# Patient Record
Sex: MALE | URBAN - METROPOLITAN AREA
[De-identification: names, ages, dates, MRNs, and addresses within clinical notes are randomized per-mention and may not be internally consistent; named-entity substitution may affect disease eponyms.]

---

## 2018-11-05 ENCOUNTER — ON CAMPUS - OUTPATIENT (OUTPATIENT)
Dept: URBAN - METROPOLITAN AREA HOSPITAL 85 | Facility: HOSPITAL | Age: 68
End: 2018-11-05

## 2018-11-05 DIAGNOSIS — R13.10 DYSPHAGIA, UNSPECIFIED: ICD-10-CM

## 2018-11-05 DIAGNOSIS — T18.128A FOOD IN ESOPHAGUS CAUSING OTHER INJURY, INITIAL ENCOUNTER: ICD-10-CM

## 2018-11-05 PROCEDURE — 43247 EGD REMOVE FOREIGN BODY: CPT | Performed by: INTERNAL MEDICINE

## 2019-10-14 ENCOUNTER — HOSPITAL ENCOUNTER (OUTPATIENT)
Dept: OTHER | Facility: HOSPITAL | Age: 69
Discharge: HOME | End: 2019-10-14
Attending: FAMILY MEDICINE

## 2019-10-14 LAB
ALBUMIN SERPL-MCNC: 4.2 G/DL (ref 3.5–5)
ALBUMIN/GLOB SERPL: 1.4 {RATIO} (ref 1.4–2.6)
ALP SERPL-CCNC: 79 U/L (ref 56–155)
ALT SERPL-CCNC: <5 U/L (ref 10–40)
ANION GAP SERPL CALC-SCNC: 19 MMOL/L (ref 8–19)
AST SERPL-CCNC: 21 U/L (ref 15–50)
BASOPHILS # BLD AUTO: 0.02 10*3/UL (ref 0–0.2)
BASOPHILS NFR BLD AUTO: 0.4 % (ref 0–3)
BILIRUB SERPL-MCNC: 0.34 MG/DL (ref 0.2–1.3)
BUN SERPL-MCNC: 25 MG/DL (ref 5–25)
BUN/CREAT SERPL: 23 {RATIO} (ref 6–20)
CALCIUM SERPL-MCNC: 9 MG/DL (ref 8.7–10.4)
CHLORIDE SERPL-SCNC: 104 MMOL/L (ref 99–111)
CHOLEST SERPL-MCNC: 153 MG/DL (ref 107–200)
CHOLEST/HDLC SERPL: 3.6 {RATIO} (ref 3–6)
CONV ABS IMM GRAN: 0.04 10*3/UL (ref 0–0.2)
CONV CO2: 23 MMOL/L (ref 22–32)
CONV IMMATURE GRAN: 0.8 % (ref 0–1.8)
CONV TOTAL PROTEIN: 7.3 G/DL (ref 6.3–8.2)
CREAT UR-MCNC: 1.1 MG/DL (ref 0.7–1.2)
DEPRECATED RDW RBC AUTO: 40.5 FL (ref 35.1–43.9)
EOSINOPHIL # BLD AUTO: 0.22 10*3/UL (ref 0–0.7)
EOSINOPHIL # BLD AUTO: 4.5 % (ref 0–7)
ERYTHROCYTE [DISTWIDTH] IN BLOOD BY AUTOMATED COUNT: 11.8 % (ref 11.6–14.4)
GFR SERPLBLD BASED ON 1.73 SQ M-ARVRAT: >60 ML/MIN/{1.73_M2}
GLOBULIN UR ELPH-MCNC: 3.1 G/DL (ref 2–3.5)
GLUCOSE SERPL-MCNC: 96 MG/DL (ref 70–99)
HCT VFR BLD AUTO: 38.6 % (ref 42–52)
HDLC SERPL-MCNC: 42 MG/DL (ref 40–60)
HGB BLD-MCNC: 13.2 G/DL (ref 14–18)
LDLC SERPL CALC-MCNC: 93 MG/DL (ref 70–100)
LYMPHOCYTES # BLD AUTO: 1.09 10*3/UL (ref 1–5)
LYMPHOCYTES NFR BLD AUTO: 22.1 % (ref 20–45)
MCH RBC QN AUTO: 31.9 PG (ref 27–31)
MCHC RBC AUTO-ENTMCNC: 34.2 G/DL (ref 33–37)
MCV RBC AUTO: 93.2 FL (ref 80–96)
MONOCYTES # BLD AUTO: 0.48 10*3/UL (ref 0.2–1.2)
MONOCYTES NFR BLD AUTO: 9.7 % (ref 3–10)
NEUTROPHILS # BLD AUTO: 3.08 10*3/UL (ref 2–8)
NEUTROPHILS NFR BLD AUTO: 62.5 % (ref 30–85)
NRBC CBCN: 0 % (ref 0–0.7)
OSMOLALITY SERPL CALC.SUM OF ELEC: 296 MOSM/KG (ref 273–304)
PLATELET # BLD AUTO: 219 10*3/UL (ref 130–400)
PMV BLD AUTO: 9.4 FL (ref 9.4–12.4)
POTASSIUM SERPL-SCNC: 4.7 MMOL/L (ref 3.5–5.3)
PSA SERPL-MCNC: 0.39 NG/ML (ref 0–4)
RBC # BLD AUTO: 4.14 10*6/UL (ref 4.7–6.1)
SODIUM SERPL-SCNC: 141 MMOL/L (ref 135–147)
TRIGL SERPL-MCNC: 92 MG/DL (ref 40–150)
VLDLC SERPL-MCNC: 18 MG/DL (ref 5–37)
WBC # BLD AUTO: 4.93 10*3/UL (ref 4.8–10.8)

## 2020-06-01 ENCOUNTER — HOSPITAL ENCOUNTER (OUTPATIENT)
Dept: GENERAL RADIOLOGY | Facility: HOSPITAL | Age: 70
Discharge: HOME | End: 2020-06-01
Attending: FAMILY MEDICINE

## 2020-07-07 ENCOUNTER — HOSPITAL ENCOUNTER (OUTPATIENT)
Dept: FAMILY MEDICINE CLINIC | Facility: CLINIC | Age: 70
Discharge: HOME | End: 2020-07-07
Attending: FAMILY MEDICINE

## 2020-07-07 ENCOUNTER — HOSPITAL ENCOUNTER (OUTPATIENT)
Dept: GENERAL RADIOLOGY | Facility: HOSPITAL | Age: 70
Discharge: HOME | End: 2020-07-07
Attending: FAMILY MEDICINE

## 2020-07-07 LAB
ALBUMIN SERPL-MCNC: 4.2 G/DL (ref 3.5–5)
ALBUMIN/GLOB SERPL: 1.4 {RATIO} (ref 1.4–2.6)
ALP SERPL-CCNC: 89 U/L (ref 56–155)
ALT SERPL-CCNC: 6 U/L (ref 10–40)
ANION GAP SERPL CALC-SCNC: 12 MMOL/L (ref 8–19)
AST SERPL-CCNC: 19 U/L (ref 15–50)
BASOPHILS # BLD AUTO: 0.04 10*3/UL (ref 0–0.2)
BASOPHILS NFR BLD AUTO: 1 % (ref 0–3)
BILIRUB SERPL-MCNC: 0.32 MG/DL (ref 0.2–1.3)
BUN SERPL-MCNC: 25 MG/DL (ref 5–25)
BUN/CREAT SERPL: 20 {RATIO} (ref 6–20)
CALCIUM SERPL-MCNC: 8.7 MG/DL (ref 8.7–10.4)
CHLORIDE SERPL-SCNC: 102 MMOL/L (ref 99–111)
CONV ABS IMM GRAN: 0.01 10*3/UL (ref 0–0.2)
CONV CO2: 27 MMOL/L (ref 22–32)
CONV IMMATURE GRAN: 0.2 % (ref 0–1.8)
CONV TOTAL PROTEIN: 7.1 G/DL (ref 6.3–8.2)
CREAT UR-MCNC: 1.23 MG/DL (ref 0.7–1.2)
DEPRECATED RDW RBC AUTO: 41.2 FL (ref 35.1–43.9)
EOSINOPHIL # BLD AUTO: 0.23 10*3/UL (ref 0–0.7)
EOSINOPHIL # BLD AUTO: 5.5 % (ref 0–7)
ERYTHROCYTE [DISTWIDTH] IN BLOOD BY AUTOMATED COUNT: 11.9 % (ref 11.6–14.4)
GFR SERPLBLD BASED ON 1.73 SQ M-ARVRAT: 59 ML/MIN/{1.73_M2}
GLOBULIN UR ELPH-MCNC: 2.9 G/DL (ref 2–3.5)
GLUCOSE SERPL-MCNC: 90 MG/DL (ref 70–99)
HCT VFR BLD AUTO: 40.1 % (ref 42–52)
HGB BLD-MCNC: 12.9 G/DL (ref 14–18)
LDH SERPL-CCNC: 240 U/L (ref 120–240)
LYMPHOCYTES # BLD AUTO: 1.43 10*3/UL (ref 1–5)
LYMPHOCYTES NFR BLD AUTO: 34 % (ref 20–45)
MCH RBC QN AUTO: 30.6 PG (ref 27–31)
MCHC RBC AUTO-ENTMCNC: 32.2 G/DL (ref 33–37)
MCV RBC AUTO: 95 FL (ref 80–96)
MONOCYTES # BLD AUTO: 0.39 10*3/UL (ref 0.2–1.2)
MONOCYTES NFR BLD AUTO: 9.3 % (ref 3–10)
NEUTROPHILS # BLD AUTO: 2.1 10*3/UL (ref 2–8)
NEUTROPHILS NFR BLD AUTO: 50 % (ref 30–85)
NRBC CBCN: 0 % (ref 0–0.7)
OSMOLALITY SERPL CALC.SUM OF ELEC: 288 MOSM/KG (ref 273–304)
PLATELET # BLD AUTO: 205 10*3/UL (ref 130–400)
PMV BLD AUTO: 9.7 FL (ref 9.4–12.4)
POTASSIUM SERPL-SCNC: 4.4 MMOL/L (ref 3.5–5.3)
RBC # BLD AUTO: 4.22 10*6/UL (ref 4.7–6.1)
SODIUM SERPL-SCNC: 137 MMOL/L (ref 135–147)
TSH SERPL-ACNC: 1.99 M[IU]/L (ref 0.27–4.2)
WBC # BLD AUTO: 4.2 10*3/UL (ref 4.8–10.8)

## 2020-08-11 ENCOUNTER — HOSPITAL ENCOUNTER (OUTPATIENT)
Dept: LAB | Facility: HOSPITAL | Age: 70
Discharge: HOME | End: 2020-08-11
Attending: FAMILY MEDICINE

## 2020-08-11 LAB
BASOPHILS # BLD AUTO: 0.04 10*3/UL (ref 0–0.2)
BASOPHILS NFR BLD AUTO: 1 % (ref 0–3)
CONV ABS IMM GRAN: 0.02 10*3/UL (ref 0–0.2)
CONV IMMATURE GRAN: 0.5 % (ref 0–1.8)
DEPRECATED RDW RBC AUTO: 42.2 FL (ref 35.1–43.9)
EOSINOPHIL # BLD AUTO: 0.3 10*3/UL (ref 0–0.7)
EOSINOPHIL # BLD AUTO: 7.4 % (ref 0–7)
ERYTHROCYTE [DISTWIDTH] IN BLOOD BY AUTOMATED COUNT: 12.1 % (ref 11.6–14.4)
HCT VFR BLD AUTO: 37.4 % (ref 42–52)
HGB BLD-MCNC: 12.1 G/DL (ref 14–18)
LYMPHOCYTES # BLD AUTO: 1.27 10*3/UL (ref 1–5)
LYMPHOCYTES NFR BLD AUTO: 31.4 % (ref 20–45)
MCH RBC QN AUTO: 30.9 PG (ref 27–31)
MCHC RBC AUTO-ENTMCNC: 32.4 G/DL (ref 33–37)
MCV RBC AUTO: 95.7 FL (ref 80–96)
MONOCYTES # BLD AUTO: 0.35 10*3/UL (ref 0.2–1.2)
MONOCYTES NFR BLD AUTO: 8.7 % (ref 3–10)
NEUTROPHILS # BLD AUTO: 2.06 10*3/UL (ref 2–8)
NEUTROPHILS NFR BLD AUTO: 51 % (ref 30–85)
NRBC CBCN: 0 % (ref 0–0.7)
PLATELET # BLD AUTO: 178 10*3/UL (ref 130–400)
PMV BLD AUTO: 9.8 FL (ref 9.4–12.4)
RBC # BLD AUTO: 3.91 10*6/UL (ref 4.7–6.1)
WBC # BLD AUTO: 4.04 10*3/UL (ref 4.8–10.8)

## 2020-09-11 ENCOUNTER — HOSPITAL ENCOUNTER (OUTPATIENT)
Dept: LAB | Facility: HOSPITAL | Age: 70
Discharge: HOME | End: 2020-09-11
Attending: FAMILY MEDICINE

## 2020-09-11 LAB
BASOPHILS # BLD AUTO: 0.04 10*3/UL (ref 0–0.2)
BASOPHILS NFR BLD AUTO: 0.8 % (ref 0–3)
CONV ABS IMM GRAN: 0.03 10*3/UL (ref 0–0.2)
CONV IMMATURE GRAN: 0.6 % (ref 0–1.8)
DEPRECATED RDW RBC AUTO: 41.1 FL (ref 35.1–43.9)
EOSINOPHIL # BLD AUTO: 0.3 10*3/UL (ref 0–0.7)
EOSINOPHIL # BLD AUTO: 6 % (ref 0–7)
ERYTHROCYTE [DISTWIDTH] IN BLOOD BY AUTOMATED COUNT: 11.9 % (ref 11.6–14.4)
HCT VFR BLD AUTO: 39.9 % (ref 42–52)
HGB BLD-MCNC: 13.1 G/DL (ref 14–18)
LYMPHOCYTES # BLD AUTO: 1.62 10*3/UL (ref 1–5)
LYMPHOCYTES NFR BLD AUTO: 32.5 % (ref 20–45)
MCH RBC QN AUTO: 31.2 PG (ref 27–31)
MCHC RBC AUTO-ENTMCNC: 32.8 G/DL (ref 33–37)
MCV RBC AUTO: 95 FL (ref 80–96)
MONOCYTES # BLD AUTO: 0.43 10*3/UL (ref 0.2–1.2)
MONOCYTES NFR BLD AUTO: 8.6 % (ref 3–10)
NEUTROPHILS # BLD AUTO: 2.56 10*3/UL (ref 2–8)
NEUTROPHILS NFR BLD AUTO: 51.5 % (ref 30–85)
NRBC CBCN: 0 % (ref 0–0.7)
PLATELET # BLD AUTO: 195 10*3/UL (ref 130–400)
PMV BLD AUTO: 9.5 FL (ref 9.4–12.4)
RBC # BLD AUTO: 4.2 10*6/UL (ref 4.7–6.1)
WBC # BLD AUTO: 4.98 10*3/UL (ref 4.8–10.8)

## 2020-11-13 ENCOUNTER — HOSPITAL ENCOUNTER (OUTPATIENT)
Dept: LAB | Facility: HOSPITAL | Age: 70
Discharge: HOME | End: 2020-11-13
Attending: FAMILY MEDICINE

## 2020-11-13 LAB
BASOPHILS # BLD AUTO: 0.04 10*3/UL (ref 0–0.2)
BASOPHILS NFR BLD AUTO: 1 % (ref 0–3)
CONV ABS IMM GRAN: 0.01 10*3/UL (ref 0–0.2)
CONV IMMATURE GRAN: 0.3 % (ref 0–1.8)
DEPRECATED RDW RBC AUTO: 40.2 FL (ref 35.1–43.9)
EOSINOPHIL # BLD AUTO: 0.28 10*3/UL (ref 0–0.7)
EOSINOPHIL # BLD AUTO: 7.2 % (ref 0–7)
ERYTHROCYTE [DISTWIDTH] IN BLOOD BY AUTOMATED COUNT: 11.8 % (ref 11.6–14.4)
HCT VFR BLD AUTO: 39.1 % (ref 42–52)
HGB BLD-MCNC: 13 G/DL (ref 14–18)
LYMPHOCYTES # BLD AUTO: 1.46 10*3/UL (ref 1–5)
LYMPHOCYTES NFR BLD AUTO: 37.4 % (ref 20–45)
MCH RBC QN AUTO: 31.1 PG (ref 27–31)
MCHC RBC AUTO-ENTMCNC: 33.2 G/DL (ref 33–37)
MCV RBC AUTO: 93.5 FL (ref 80–96)
MONOCYTES # BLD AUTO: 0.4 10*3/UL (ref 0.2–1.2)
MONOCYTES NFR BLD AUTO: 10.3 % (ref 3–10)
NEUTROPHILS # BLD AUTO: 1.71 10*3/UL (ref 2–8)
NEUTROPHILS NFR BLD AUTO: 43.8 % (ref 30–85)
NRBC CBCN: 0 % (ref 0–0.7)
PLATELET # BLD AUTO: 203 10*3/UL (ref 130–400)
PMV BLD AUTO: 9.2 FL (ref 9.4–12.4)
PSA SERPL-MCNC: 0.56 NG/ML (ref 0–4)
RBC # BLD AUTO: 4.18 10*6/UL (ref 4.7–6.1)
WBC # BLD AUTO: 3.9 10*3/UL (ref 4.8–10.8)

## 2021-01-18 ENCOUNTER — HOSPITAL ENCOUNTER (OUTPATIENT)
Dept: OTHER | Facility: HOSPITAL | Age: 71
Discharge: HOME | End: 2021-01-18
Attending: INTERNAL MEDICINE

## 2021-02-12 ENCOUNTER — HOSPITAL ENCOUNTER (OUTPATIENT)
Dept: OTHER | Facility: HOSPITAL | Age: 71
Discharge: HOME | End: 2021-02-12
Attending: FAMILY MEDICINE

## 2021-02-12 ENCOUNTER — HOSPITAL ENCOUNTER (OUTPATIENT)
Dept: VACCINE CLINIC | Facility: HOSPITAL | Age: 71
Discharge: HOME | End: 2021-02-12
Attending: INTERNAL MEDICINE

## 2021-02-12 LAB
ALBUMIN SERPL-MCNC: 3.9 G/DL (ref 3.5–5)
ALBUMIN/GLOB SERPL: 1.3 {RATIO} (ref 1.4–2.6)
ALP SERPL-CCNC: 82 U/L (ref 56–155)
ALT SERPL-CCNC: 7 U/L (ref 10–40)
ANION GAP SERPL CALC-SCNC: 13 MMOL/L (ref 8–19)
AST SERPL-CCNC: 17 U/L (ref 15–50)
BASOPHILS # BLD AUTO: 0.04 10*3/UL (ref 0–0.2)
BASOPHILS NFR BLD AUTO: 0.9 % (ref 0–3)
BILIRUB SERPL-MCNC: 0.35 MG/DL (ref 0.2–1.3)
BUN SERPL-MCNC: 24 MG/DL (ref 5–25)
BUN/CREAT SERPL: 22 {RATIO} (ref 6–20)
CALCIUM SERPL-MCNC: 8.4 MG/DL (ref 8.7–10.4)
CHLORIDE SERPL-SCNC: 105 MMOL/L (ref 99–111)
CONV ABS IMM GRAN: 0.03 10*3/UL (ref 0–0.2)
CONV CO2: 27 MMOL/L (ref 22–32)
CONV IMMATURE GRAN: 0.7 % (ref 0–1.8)
CONV TOTAL PROTEIN: 7 G/DL (ref 6.3–8.2)
CREAT UR-MCNC: 1.09 MG/DL (ref 0.7–1.2)
DEPRECATED RDW RBC AUTO: 40.4 FL (ref 35.1–43.9)
EOSINOPHIL # BLD AUTO: 0.25 10*3/UL (ref 0–0.7)
EOSINOPHIL # BLD AUTO: 5.4 % (ref 0–7)
ERYTHROCYTE [DISTWIDTH] IN BLOOD BY AUTOMATED COUNT: 11.8 % (ref 11.6–14.4)
GFR SERPLBLD BASED ON 1.73 SQ M-ARVRAT: >60 ML/MIN/{1.73_M2}
GLOBULIN UR ELPH-MCNC: 3.1 G/DL (ref 2–3.5)
GLUCOSE SERPL-MCNC: 92 MG/DL (ref 70–99)
HCT VFR BLD AUTO: 39.6 % (ref 42–52)
HGB BLD-MCNC: 13.5 G/DL (ref 14–18)
LYMPHOCYTES # BLD AUTO: 1.57 10*3/UL (ref 1–5)
LYMPHOCYTES NFR BLD AUTO: 34.1 % (ref 20–45)
MCH RBC QN AUTO: 31.7 PG (ref 27–31)
MCHC RBC AUTO-ENTMCNC: 34.1 G/DL (ref 33–37)
MCV RBC AUTO: 93 FL (ref 80–96)
MONOCYTES # BLD AUTO: 0.41 10*3/UL (ref 0.2–1.2)
MONOCYTES NFR BLD AUTO: 8.9 % (ref 3–10)
NEUTROPHILS # BLD AUTO: 2.31 10*3/UL (ref 2–8)
NEUTROPHILS NFR BLD AUTO: 50 % (ref 30–85)
NRBC CBCN: 0 % (ref 0–0.7)
OSMOLALITY SERPL CALC.SUM OF ELEC: 296 MOSM/KG (ref 273–304)
PLATELET # BLD AUTO: 183 10*3/UL (ref 130–400)
PMV BLD AUTO: 8.6 FL (ref 9.4–12.4)
POTASSIUM SERPL-SCNC: 4.1 MMOL/L (ref 3.5–5.3)
RBC # BLD AUTO: 4.26 10*6/UL (ref 4.7–6.1)
SODIUM SERPL-SCNC: 141 MMOL/L (ref 135–147)
WBC # BLD AUTO: 4.61 10*3/UL (ref 4.8–10.8)

## 2021-05-25 ENCOUNTER — CONVERSION ENCOUNTER (OUTPATIENT)
Dept: SURGERY | Facility: CLINIC | Age: 71
End: 2021-05-25

## 2021-05-25 ENCOUNTER — OFFICE VISIT CONVERTED (OUTPATIENT)
Dept: UROLOGY | Facility: CLINIC | Age: 71
End: 2021-05-25
Attending: UROLOGY

## 2021-06-01 ENCOUNTER — OFFICE VISIT CONVERTED (OUTPATIENT)
Dept: UROLOGY | Facility: CLINIC | Age: 71
End: 2021-06-01
Attending: NURSE PRACTITIONER

## 2021-06-01 ENCOUNTER — CONVERSION ENCOUNTER (OUTPATIENT)
Dept: SURGERY | Facility: CLINIC | Age: 71
End: 2021-06-01

## 2021-06-01 LAB
BILIRUB UR QL STRIP: NORMAL
COLOR UR: YELLOW
CONV BACTERIA IN URINE MICRO: 0
CONV CALCIUM OXALATE CRYSTALS /HPF IN URINE SEDIMENT BY MICROSCOPY: 0
CONV CLARITY OF URINE: CLEAR
CONV PROTEIN IN URINE BY AUTOMATED TEST STRIP: NEGATIVE
CONV UROBILINOGEN IN URINE BY AUTOMATED TEST STRIP: NORMAL
GLUCOSE UR QL: NEGATIVE
HGB UR QL STRIP: NEGATIVE
KETONES UR QL STRIP: NEGATIVE
LEUKOCYTE ESTERASE UR QL STRIP: NEGATIVE
NITRITE UR QL STRIP: NEGATIVE
PH UR STRIP.AUTO: 5.5 [PH]
RBC #/AREA URNS HPF: 0 /[HPF]
RENAL EPI CELLS #/AREA URNS HPF: 0 /[HPF]
SP GR UR: NORMAL
SQUAMOUS SPT QL MICRO: 0
WBC #/AREA URNS HPF: 0 /[HPF]

## 2021-06-05 NOTE — PROGRESS NOTES
Progress Note      Patient Name: Chinmay Roque   Patient ID: [id number]   Sex: Male   YOB: 1950    Primary Care Provider: Taylor Blake MD    Visit Date: June 1, 2021    Provider: MALGORZATA Horton   Location: [facility]   Location Address: [address] [place] [address]  [id number]   Location Phone: [phone]          Chief Complaint  · urological issues      History Of Present Illness     70-year-old  male patient presents for follow-up PVR check.    Previously the patient had been in urinary retention with a catheter placed.    The patient had catheter removed at last visit and had been started on Flomax 0.4 mg daily.    The patient states that he has 0 side effects from the Tamsulosin.    He is voiding without issue at this time.    PVR today is 83 mL       Past Surgical History  Procedure Name Date Notes   Colonoscopy 2018 --          Medication List  Name Date Started Instructions   aspirin 81 mg oral tablet,chewable  chew 1 tablet (81 mg) by oral route once daily   clonazepam 1 mg oral tablet  take 1 tablet (1 mg) by oral route 2 times per day   Ferrex 150 Forte 150-25-1 mg-mcg-mg oral capsule  take 1 capsule by oral route once daily   Flomax 0.4 mg oral capsule 05/25/2021 take 1 capsule (0.4 mg) by oral route once daily 1/2 hour following the same meal each day for 90 days   Seroquel 400 mg oral tablet  take 1 tablet (400 mg) by oral route once daily   vitamin B complex oral capsule  take 1 capsule by oral route daily         Allergy List  Allergen Name Date Reaction Notes   NO KNOWN DRUG ALLERGIES --  --  --        Allergies Reconciled  Family Medical History  Disease Name Relative/Age Notes   - No Family History of Colorectal Cancer  --    Prostate cancer Brother/   --          Social History  Finding Status Start/Stop Quantity Notes   Alcohol Current some day --/-- --  05/25/2021 - occasionally   Tobacco Never --/-- --  --          Review of  "Systems  · Constitutional  o Denies  o : fatigue, night sweats  · Eyes  o Denies  o : double vision, blurred vision  · HENT  o Denies  o : vertigo, recent head injury  · Breasts  o Denies  o : abnormal changes in breast size, additional breast symptoms except as noted in the HPI  · Cardiovascular  o Denies  o : chest pain, irregular heart beats  · Respiratory  o Denies  o : shortness of breath, productive cough  · Gastrointestinal  o Denies  o : nausea, vomiting  · Genitourinary  o Denies  o : urgency, frequency, dysuria, hematuria, urinary retention  · Integument  o Denies  o : hair growth change, new skin lesions  · Neurologic  o Denies  o : altered mental status, seizures  · Musculoskeletal  o Denies  o : joint swelling, limitation of motion  · Endocrine  o Denies  o : cold intolerance, heat intolerance  · Heme-Lymph  o Denies  o : petechiae, lymph node enlargement or tenderness  · Allergic-Immunologic  o Denies  o : frequent illnesses      Vitals  Date Time BP Position Site L\R Cuff Size HR RR TEMP (F) WT  HT  BMI kg/m2 BSA m2 O2 Sat FR L/min FiO2        06/01/2021 10:15 AM       12  210lbs 0oz 6'  4\" 25.56 2.26             Physical Examination  · Constitutional  o Appearance  o : Well-appearing, well-developed, in no acute distress  · Head and Face  o Head  o :   § Inspection  § : atraumatic, normocephalic  o Face  o :   § Inspection  § : no facial lesions  · Eyes  o Sclerae  o : sclerae white  · Ears, Nose, Mouth and Throat  o Ears  o :   § External Ears  § : appearance within normal limits, no lesions present  o Nose  o :   § External Nose  § : appearance normal  · Respiratory  o Respiratory Effort  o : Unlabored breathing  o Inspection of Chest  o : normal appearance, no retractions  · Musculoskeletal  o Pelvis  o : no pelvic bony or muscular tenderness  o Ribs  o : no deformities or tenderness to palpation present, costochondral junctions nontender to palpation  · Skin and Subcutaneous Tissue  o General " Inspection  o : no rashes or lesions present, no lesions present, no areas of discoloration  · Neurologic  o Mental Status Examination  o :   § Orientation  § : Grossly oriented to person, place and time, judgment and insight intact, normal mood and affect  § Speech/Language  § : communication ability within normal limits  o Gait and Station  o : normal gait, able to stand without difficulty  · Psychiatric  o Judgement and Insight  o : judgment and insight intact, judgement for everyday activities and social situations within normal limits, insight intact  o Mood and Affect  o : mood normal, affect appropriate          Results  · In-Office Procedures  o Surgical procedure  § IOP - Bladder Scan/Residual Urine (29171)   § Specimen vol Ur: 93   o Lab procedure  § Automated dipstick urinalysis with microscopy (73862)   § Color Ur: Yellow   § Clarity Ur: Clear   § Glucose Ur Ql Strip: Negative   § Bilirub Ur Ql Strip: Large   § Ketones Ur Ql Strip: Negative   § Sp Gr Ur Qn: greater than 1.030   § Hgb Ur Ql Strip: Negative   § pH Ur-LsCnc: 5.5   § Prot Ur Ql Strip: Negative   § Urobilinogen Ur Strip-mCnc: 0.2 E.U./dL   § Nitrite Ur Ql Strip: Negative   § WBC Est Ur Ql Strip: Negative   § RBC UrnS Qn HPF: 0   § WBC UrnS Qn HPF: 0   § Bacteria UrnS Qn HPF: 0   § Crystals UrnS Qn HPF: 0   § Epithelial Cells (non renal): 0 /HPF  § Epithelial Cells (renal): 0       Assessment  · BPH (benign prostatic hyperplasia)     600.00/N40.0  · Urinary retention     788.20/R33.9      Plan  · Medications  o Medications have been Reconciled  o Transition of Care or Provider Policy  · Instructions  o Electronically Identified Patient Education Materials Provided Electronically     BPH with urinary retention    Patient to Continue Flomax 0.4 mg daily.     Patient will follow up in 1 month as scheduled with Dr. Osullivan.                     Electronically Signed by: MALGORZATA Horton -Author on June 1, 2021 12:23:11 PM
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
## 2021-06-28 PROBLEM — R35.0 BENIGN PROSTATIC HYPERPLASIA WITH URINARY FREQUENCY: Status: ACTIVE | Noted: 2021-06-28

## 2021-06-28 PROBLEM — N40.1 BENIGN PROSTATIC HYPERPLASIA WITH URINARY FREQUENCY: Status: ACTIVE | Noted: 2021-06-28

## 2021-06-28 PROBLEM — R33.9 URINARY RETENTION: Status: ACTIVE | Noted: 2021-06-28
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
## 2021-06-29 ENCOUNTER — OFFICE VISIT (OUTPATIENT)
Dept: UROLOGY | Facility: CLINIC | Age: 71
End: 2021-06-29

## 2021-06-29 VITALS — RESPIRATION RATE: 17 BRPM | WEIGHT: 210 LBS | BODY MASS INDEX: 25.57 KG/M2 | HEIGHT: 76 IN

## 2021-06-29 DIAGNOSIS — R35.0 BENIGN PROSTATIC HYPERPLASIA WITH URINARY FREQUENCY: Primary | ICD-10-CM

## 2021-06-29 DIAGNOSIS — R33.9 URINARY RETENTION: ICD-10-CM

## 2021-06-29 DIAGNOSIS — N40.1 BENIGN PROSTATIC HYPERPLASIA WITH URINARY FREQUENCY: Primary | ICD-10-CM

## 2021-06-29 LAB — SPECIMEN VOL 24H UR: 7 L

## 2021-06-29 PROCEDURE — 99213 OFFICE O/P EST LOW 20 MIN: CPT | Performed by: UROLOGY

## 2021-06-29 RX ORDER — ETODOLAC 400 MG/1
400 TABLET, EXTENDED RELEASE ORAL 2 TIMES DAILY WITH MEALS
COMMUNITY
Start: 2021-05-29 | End: 2021-11-22 | Stop reason: SDUPTHER

## 2021-06-29 RX ORDER — TAMSULOSIN HYDROCHLORIDE 0.4 MG/1
CAPSULE ORAL
COMMUNITY
Start: 2021-06-16 | End: 2021-11-22

## 2021-06-29 RX ORDER — QUETIAPINE FUMARATE 400 MG/1
400-800 TABLET, FILM COATED ORAL NIGHTLY
COMMUNITY
Start: 2021-05-12

## 2021-06-29 RX ORDER — CLONAZEPAM 1 MG/1
1 TABLET ORAL NIGHTLY
COMMUNITY
Start: 2021-05-16

## 2021-06-29 RX ORDER — CEPHALEXIN 250 MG/1
CAPSULE ORAL
COMMUNITY
Start: 2021-05-22 | End: 2021-11-22

## 2021-06-29 RX ORDER — COLLAGENASE SANTYL 250 [ARB'U]/G
OINTMENT TOPICAL
COMMUNITY
Start: 2021-06-07

## 2021-06-29 RX ORDER — ASPIRIN 81 MG/1
TABLET, CHEWABLE ORAL
COMMUNITY
Start: 2021-05-16 | End: 2021-11-22 | Stop reason: SDUPTHER

## 2021-06-29 RX ORDER — BETAMETHASONE DIPROPIONATE 0.5 MG/G
LOTION TOPICAL
COMMUNITY
Start: 2021-06-05 | End: 2021-11-22 | Stop reason: SDUPTHER

## 2021-07-15 VITALS — RESPIRATION RATE: 12 BRPM | WEIGHT: 210 LBS | BODY MASS INDEX: 25.57 KG/M2 | HEIGHT: 76 IN

## 2021-07-15 VITALS — BODY MASS INDEX: 25.01 KG/M2 | WEIGHT: 205.37 LBS | HEIGHT: 76 IN | RESPIRATION RATE: 13 BRPM

## 2021-11-22 ENCOUNTER — TELEPHONE (OUTPATIENT)
Dept: INTERNAL MEDICINE | Facility: CLINIC | Age: 71
End: 2021-11-22

## 2021-11-22 ENCOUNTER — OFFICE VISIT (OUTPATIENT)
Dept: INTERNAL MEDICINE | Facility: CLINIC | Age: 71
End: 2021-11-22

## 2021-11-22 VITALS
OXYGEN SATURATION: 98 % | RESPIRATION RATE: 12 BRPM | HEART RATE: 68 BPM | HEIGHT: 76 IN | SYSTOLIC BLOOD PRESSURE: 138 MMHG | TEMPERATURE: 96.2 F | DIASTOLIC BLOOD PRESSURE: 78 MMHG | BODY MASS INDEX: 24.96 KG/M2 | WEIGHT: 205 LBS

## 2021-11-22 DIAGNOSIS — H93.13 TINNITUS OF BOTH EARS: ICD-10-CM

## 2021-11-22 DIAGNOSIS — Z11.59 NEED FOR HEPATITIS C SCREENING TEST: ICD-10-CM

## 2021-11-22 DIAGNOSIS — Z13.220 SCREENING, LIPID: ICD-10-CM

## 2021-11-22 DIAGNOSIS — Z76.89 ENCOUNTER TO ESTABLISH CARE: Primary | ICD-10-CM

## 2021-11-22 DIAGNOSIS — R90.89 OTHER ABNORMAL FINDINGS ON DIAGNOSTIC IMAGING OF CENTRAL NERVOUS SYSTEM: ICD-10-CM

## 2021-11-22 DIAGNOSIS — R03.0 ELEVATED BLOOD PRESSURE READING: ICD-10-CM

## 2021-11-22 DIAGNOSIS — D50.9 IRON DEFICIENCY ANEMIA, UNSPECIFIED IRON DEFICIENCY ANEMIA TYPE: ICD-10-CM

## 2021-11-22 PROBLEM — H93.19 TINNITUS: Status: ACTIVE | Noted: 2021-11-22

## 2021-11-22 LAB
ALBUMIN SERPL-MCNC: 4.2 G/DL (ref 3.5–5.2)
ALBUMIN/GLOB SERPL: 1.4 G/DL
ALP SERPL-CCNC: 95 U/L (ref 39–117)
ALT SERPL W P-5'-P-CCNC: 8 U/L (ref 1–41)
ANION GAP SERPL CALCULATED.3IONS-SCNC: 7.3 MMOL/L (ref 5–15)
AST SERPL-CCNC: 21 U/L (ref 1–40)
BASOPHILS # BLD AUTO: 0.03 10*3/MM3 (ref 0–0.2)
BASOPHILS NFR BLD AUTO: 0.8 % (ref 0–1.5)
BILIRUB SERPL-MCNC: 0.4 MG/DL (ref 0–1.2)
BUN SERPL-MCNC: 22 MG/DL (ref 8–23)
BUN/CREAT SERPL: 18.5 (ref 7–25)
CALCIUM SPEC-SCNC: 9.2 MG/DL (ref 8.6–10.5)
CHLORIDE SERPL-SCNC: 104 MMOL/L (ref 98–107)
CHOLEST SERPL-MCNC: 177 MG/DL (ref 0–200)
CO2 SERPL-SCNC: 28.7 MMOL/L (ref 22–29)
CREAT SERPL-MCNC: 1.19 MG/DL (ref 0.76–1.27)
DEPRECATED RDW RBC AUTO: 41.9 FL (ref 37–54)
EOSINOPHIL # BLD AUTO: 0.32 10*3/MM3 (ref 0–0.4)
EOSINOPHIL NFR BLD AUTO: 8.2 % (ref 0.3–6.2)
ERYTHROCYTE [DISTWIDTH] IN BLOOD BY AUTOMATED COUNT: 12.1 % (ref 12.3–15.4)
GFR SERPL CREATININE-BSD FRML MDRD: 60 ML/MIN/1.73
GLOBULIN UR ELPH-MCNC: 3 GM/DL
GLUCOSE SERPL-MCNC: 90 MG/DL (ref 65–99)
HCT VFR BLD AUTO: 40.4 % (ref 37.5–51)
HCV AB SER DONR QL: NORMAL
HDLC SERPL-MCNC: 43 MG/DL (ref 40–60)
HGB BLD-MCNC: 13.7 G/DL (ref 13–17.7)
IMM GRANULOCYTES # BLD AUTO: 0.02 10*3/MM3 (ref 0–0.05)
IMM GRANULOCYTES NFR BLD AUTO: 0.5 % (ref 0–0.5)
IRON 24H UR-MRATE: 128 MCG/DL (ref 59–158)
IRON SATN MFR SERPL: 37 % (ref 20–50)
LDLC SERPL CALC-MCNC: 114 MG/DL (ref 0–100)
LDLC/HDLC SERPL: 2.61 {RATIO}
LYMPHOCYTES # BLD AUTO: 1.3 10*3/MM3 (ref 0.7–3.1)
LYMPHOCYTES NFR BLD AUTO: 33.5 % (ref 19.6–45.3)
MCH RBC QN AUTO: 31.6 PG (ref 26.6–33)
MCHC RBC AUTO-ENTMCNC: 33.9 G/DL (ref 31.5–35.7)
MCV RBC AUTO: 93.3 FL (ref 79–97)
MONOCYTES # BLD AUTO: 0.36 10*3/MM3 (ref 0.1–0.9)
MONOCYTES NFR BLD AUTO: 9.3 % (ref 5–12)
NEUTROPHILS NFR BLD AUTO: 1.85 10*3/MM3 (ref 1.7–7)
NEUTROPHILS NFR BLD AUTO: 47.7 % (ref 42.7–76)
NRBC BLD AUTO-RTO: 0 /100 WBC (ref 0–0.2)
PLATELET # BLD AUTO: 213 10*3/MM3 (ref 140–450)
PMV BLD AUTO: 10.2 FL (ref 6–12)
POTASSIUM SERPL-SCNC: 4.9 MMOL/L (ref 3.5–5.2)
PROT SERPL-MCNC: 7.2 G/DL (ref 6–8.5)
RBC # BLD AUTO: 4.33 10*6/MM3 (ref 4.14–5.8)
SODIUM SERPL-SCNC: 140 MMOL/L (ref 136–145)
TIBC SERPL-MCNC: 346 MCG/DL (ref 298–536)
TRANSFERRIN SERPL-MCNC: 232 MG/DL (ref 200–360)
TRIGL SERPL-MCNC: 108 MG/DL (ref 0–150)
TSH SERPL DL<=0.05 MIU/L-ACNC: 2.91 UIU/ML (ref 0.27–4.2)
VLDLC SERPL-MCNC: 20 MG/DL (ref 5–40)
WBC NRBC COR # BLD: 3.88 10*3/MM3 (ref 3.4–10.8)

## 2021-11-22 PROCEDURE — 1159F MED LIST DOCD IN RCRD: CPT | Performed by: INTERNAL MEDICINE

## 2021-11-22 PROCEDURE — 83540 ASSAY OF IRON: CPT | Performed by: INTERNAL MEDICINE

## 2021-11-22 PROCEDURE — 85025 COMPLETE CBC W/AUTO DIFF WBC: CPT | Performed by: INTERNAL MEDICINE

## 2021-11-22 PROCEDURE — 1170F FXNL STATUS ASSESSED: CPT | Performed by: INTERNAL MEDICINE

## 2021-11-22 PROCEDURE — 36415 COLL VENOUS BLD VENIPUNCTURE: CPT | Performed by: INTERNAL MEDICINE

## 2021-11-22 PROCEDURE — 80061 LIPID PANEL: CPT | Performed by: INTERNAL MEDICINE

## 2021-11-22 PROCEDURE — 99202 OFFICE O/P NEW SF 15 MIN: CPT | Performed by: INTERNAL MEDICINE

## 2021-11-22 PROCEDURE — 80053 COMPREHEN METABOLIC PANEL: CPT | Performed by: INTERNAL MEDICINE

## 2021-11-22 PROCEDURE — 86803 HEPATITIS C AB TEST: CPT | Performed by: INTERNAL MEDICINE

## 2021-11-22 PROCEDURE — 84466 ASSAY OF TRANSFERRIN: CPT | Performed by: INTERNAL MEDICINE

## 2021-11-22 PROCEDURE — G0439 PPPS, SUBSEQ VISIT: HCPCS | Performed by: INTERNAL MEDICINE

## 2021-11-22 PROCEDURE — 84443 ASSAY THYROID STIM HORMONE: CPT | Performed by: INTERNAL MEDICINE

## 2021-11-22 RX ORDER — ASPIRIN 81 MG/1
81 TABLET, CHEWABLE ORAL DAILY
Qty: 90 TABLET | Refills: 1 | Status: SHIPPED | OUTPATIENT
Start: 2021-11-22

## 2021-11-22 RX ORDER — ETODOLAC 400 MG/1
400 TABLET, EXTENDED RELEASE ORAL 2 TIMES DAILY WITH MEALS
Qty: 180 TABLET | Refills: 1 | Status: SHIPPED | OUTPATIENT
Start: 2021-11-22 | End: 2022-05-23 | Stop reason: SDUPTHER

## 2021-11-22 RX ORDER — FERROUS SULFATE 325(65) MG
325 TABLET ORAL 2 TIMES DAILY
COMMUNITY

## 2021-11-22 RX ORDER — SILDENAFIL CITRATE 20 MG/1
20 TABLET ORAL DAILY PRN
Qty: 90 TABLET | Refills: 1 | Status: SHIPPED | OUTPATIENT
Start: 2021-11-22 | End: 2022-05-23 | Stop reason: SDUPTHER

## 2021-11-22 RX ORDER — BETAMETHASONE DIPROPIONATE 0.5 MG/G
LOTION TOPICAL 2 TIMES DAILY
Qty: 90 ML | Refills: 1 | Status: SHIPPED | OUTPATIENT
Start: 2021-11-22 | End: 2021-11-24

## 2021-11-22 RX ORDER — NYSTATIN AND TRIAMCINOLONE ACETONIDE 100000; 1 [USP'U]/G; MG/G
1 OINTMENT TOPICAL 2 TIMES DAILY
Qty: 180 G | Refills: 2 | Status: SHIPPED | OUTPATIENT
Start: 2021-11-22 | End: 2022-05-23 | Stop reason: SDUPTHER

## 2021-11-22 NOTE — PROGRESS NOTES
The ABCs of the Annual Wellness Visit  Subsequent Medicare Wellness Visit    Chief Complaint   Patient presents with   • Establish Care      Subjective    History of Present Illness:  Chinmay Roque is a 71 y.o. male who presents for a Subsequent Medicare Wellness Visit.    The following portions of the patient's history were reviewed and   updated as appropriate: allergies, current medications, past family history, past medical history, past social history, past surgical history and problem list.    Compared to one year ago, the patient feels his physical   health is the same.    Compared to one year ago, the patient feels his mental   health is the same.    Recent Hospitalizations:  He was not admitted to the hospital during the last year.       Current Medical Providers:  Patient Care Team:  Taylor Blake MD as PCP - General (Internal Medicine)    Outpatient Medications Prior to Visit   Medication Sig Dispense Refill   • aspirin 81 MG chewable tablet aspirin 81 mg oral tablet,chewable chew 1 tablet (81 mg) by oral route once daily   Active     • B Complex Vitamins (VITAMIN B COMPLEX 100 IJ) vitamin B complex oral capsule take 1 capsule by oral route daily   Active     • clonazePAM (KlonoPIN) 1 MG tablet 1 mg Every Night.     • etodolac XL (LODINE XL) 400 MG 24 hr tablet Take 400 mg by mouth 2 (Two) Times a Day With Meals.     • ferrous sulfate 325 (65 FE) MG tablet Take 325 mg by mouth 2 (Two) Times a Day.     • QUEtiapine (SEROquel) 400 MG tablet Take 400-800 mg by mouth Every Night.     • betamethasone dipropionate (DIPROLENE) 0.05 % lotion      • Santyl 250 UNIT/GM ointment APPLY TO THE AFFECTED AREA(S) TWICE DAILY     • cephalexin (KEFLEX) 250 MG capsule TAKE 1 CAPSULE, BY MOUTH FOUR TIMES DAILY FOR 7 DAYS     • tamsulosin (FLOMAX) 0.4 MG capsule 24 hr capsule TAKE 1 CAPSULE BY MOUTH once DAILY one-half hour following the same meal each day       No facility-administered medications prior to visit.  "      No opioid medication identified on active medication list. I have reviewed chart for other potential  high risk medication/s and harmful drug interactions in the elderly.          Aspirin is on active medication list. Aspirin use is indicated based on review of current medical condition/s. Pros and cons of this therapy have been discussed today. Benefits of this medication outweigh potential harm.  Patient has been encouraged to continue taking this medication.  .      Patient Active Problem List   Diagnosis   • Benign prostatic hyperplasia with urinary frequency   • Urinary retention   • Tinnitus     Advance Care Planning  Advance Directive is on file.  ACP discussion was held with the patient during this visit. Patient has an advance directive in EMR which is still valid.           Objective    Vitals:    11/22/21 0807   BP: 138/78   Pulse: 68   Resp: 12   Temp: 96.2 °F (35.7 °C)   SpO2: 98%   Weight: 93 kg (205 lb)   Height: 193 cm (76\")     BMI Readings from Last 1 Encounters:   11/22/21 24.95 kg/m²   BMI is within normal parameters. No follow-up required.    Does the patient have evidence of cognitive impairment? No    Physical Exam            HEALTH RISK ASSESSMENT    Smoking Status:  Social History     Tobacco Use   Smoking Status Never Smoker   Smokeless Tobacco Never Used     Alcohol Consumption:  Social History     Substance and Sexual Activity   Alcohol Use Yes    Comment: current some day 05/25/2021 occasonally      Fall Risk Screen:    MIKE Fall Risk Assessment was completed, and patient is at MODERATE risk for falls. Assessment completed on:11/22/2021    Depression Screening:  PHQ-2/PHQ-9 Depression Screening 11/22/2021   Little interest or pleasure in doing things 0   Feeling down, depressed, or hopeless 0   Trouble falling or staying asleep, or sleeping too much 0   Feeling tired or having little energy 0   Poor appetite or overeating 0   Feeling bad about yourself - or that you are a failure " or have let yourself or your family down 0   Trouble concentrating on things, such as reading the newspaper or watching television 0   Moving or speaking so slowly that other people could have noticed. Or the opposite - being so fidgety or restless that you have been moving around a lot more than usual 0   Thoughts that you would be better off dead, or of hurting yourself in some way 0   Total Score 0   If you checked off any problems, how difficult have these problems made it for you to do your work, take care of things at home, or get along with other people? Not difficult at all       Health Habits and Functional and Cognitive Screening:  Functional & Cognitive Status 11/22/2021   Do you have difficulty preparing food and eating? No   Do you have difficulty bathing yourself, getting dressed or grooming yourself? No   Do you have difficulty using the toilet? No   Do you have difficulty moving around from place to place? No   Do you have trouble with steps or getting out of a bed or a chair? No   Current Diet Well Balanced Diet   Dental Exam Up to date   Eye Exam Up to date   Exercise (times per week) 0 times per week   Current Exercises Include Other   Do you need help using the phone?  No   Are you deaf or do you have serious difficulty hearing?  No   Do you need help with transportation? No   Do you need help shopping? No   Do you need help preparing meals?  No   Do you need help with housework?  No   Do you need help with laundry? No   Do you need help taking your medications? No   Do you need help managing money? No   Do you ever drive or ride in a car without wearing a seat belt? No   Have you felt unusual stress, anger or loneliness in the last month? No   Who do you live with? Spouse   If you need help, do you have trouble finding someone available to you? No   Have you been bothered in the last four weeks by sexual problems? No   Do you have difficulty concentrating, remembering or making decisions? No        Age-appropriate Screening Schedule:  Refer to the list below for future screening recommendations based on patient's age, sex and/or medical conditions. Orders for these recommended tests are listed in the plan section. The patient has been provided with a written plan.    Health Maintenance   Topic Date Due   • TDAP/TD VACCINES (3 - Td or Tdap) 04/11/2030   • INFLUENZA VACCINE  Completed   • ZOSTER VACCINE  Completed              Assessment/Plan   CMS Preventative Services Quick Reference  Risk Factors Identified During Encounter  Dementia/Memory   Immunizations Discussed/Encouraged (specific Immunizations; UTD  The above risks/problems have been discussed with the patient.  Follow up actions/plans if indicated are seen below in the Assessment/Plan Section.  Pertinent information has been shared with the patient in the After Visit Summary.    Diagnoses and all orders for this visit:    1. Encounter to establish care (Primary)    2. Screening, lipid  -     Lipid Panel    3. Iron deficiency anemia, unspecified iron deficiency anemia type  Comments:  will check labs and adjust as needed  Orders:  -     CBC & Differential  -     Iron Profile    4. Elevated blood pressure reading  Comments:  he will monitor at home  Orders:  -     Comprehensive Metabolic Panel  -     TSH    5. Other abnormal findings on diagnostic imaging of central nervous system  Comments:  will check thyroid  Orders:  -     TSH    6. Tinnitus of both ears  Comments:  discussed ENT and hearing aides, but he would like to think about this right now    7. Need for hepatitis C screening test  -     Hepatitis C Antibody; Future        Follow Up:   Return in about 6 months (around 5/22/2022).     An After Visit Summary and PPPS were made available to the patient.

## 2021-11-22 NOTE — PROGRESS NOTES
"Chief Complaint  Establish Care    Subjective          Chinmay Roque presents to [facility]  History of Present Illness    Feels like he is in excellent health other than tinnitus    Started about 15 years ago  Went to a clinic in [place]  Went to a psychologist in [place]  He was having trouble with sleep and was started on klonpin and seroquel  He is doing well this     Saw Dr. Huggins who told him it was in the brain    He hears the ringing all the time  Both ears  Doesn't remember an illness or hitting his head or anything else to bring it on  Hasn't really changed over the last 15 years    He still follows up with his psychologist for his current meds    No chest pain  No trouble breathing    Reviewed medical history  Has skin graft in the past and has done well with this    Objective   Vital Signs:   /78   Pulse 68   Temp 96.2 °F (35.7 °C)   Resp 12   Ht 193 cm (76\")   Wt 93 kg (205 lb)   SpO2 98%   BMI 24.95 kg/m²     Physical Exam  Vitals reviewed.   Constitutional:       Appearance: Normal appearance. He is well-developed.   HENT:      Head: Normocephalic and atraumatic.      Right Ear: External ear normal.      Left Ear: External ear normal.   Eyes:      Conjunctiva/sclera: Conjunctivae normal.      Pupils: Pupils are equal, round, and reactive to light.   Cardiovascular:      Rate and Rhythm: Normal rate and regular rhythm.      Heart sounds: No murmur heard.  No friction rub. No gallop.    Pulmonary:      Effort: Pulmonary effort is normal.      Breath sounds: Normal breath sounds. No wheezing or rhonchi.   Skin:     General: Skin is warm and dry.   Neurological:      Mental Status: He is alert and oriented to person, place, and time.      Cranial Nerves: No cranial nerve deficit.   Psychiatric:         Mood and Affect: Affect normal.         Behavior: Behavior normal.         Thought Content: Thought content normal.        Result Review : "     Common labs    Common Labsle 2/12/21 2/12/21    0711 0711   Glucose  92   BUN  24   Creatinine  1.09   Sodium  141   Potassium  4.1   Chloride  105   Calcium  8.4 (A)   Albumin  3.9   Total Bilirubin  0.35   Alkaline Phosphatase  82   AST (SGOT)  17   ALT (SGPT)  7 (A)   WBC 4.61 (A)    Hemoglobin 13.5 (A)    Hematocrit 39.6 (A)    Platelets 183    (A) Abnormal value       Comments are available for some flowsheets but are not being displayed.              Procedures      Assessment and Plan    Diagnoses and all orders for this visit:    1. Encounter to establish care (Primary)    2. Screening, lipid  -     Lipid Panel    3. Iron deficiency anemia, unspecified iron deficiency anemia type  Comments:  will check labs and adjust as needed  Orders:  -     CBC & Differential  -     Iron Profile    4. Elevated blood pressure reading  Comments:  he will monitor at home  Orders:  -     Comprehensive Metabolic Panel  -     TSH    5. Other abnormal findings on diagnostic imaging of central nervous system  Comments:  will check thyroid  Orders:  -     TSH    6. Tinnitus of both ears  Comments:  discussed ENT and hearing aides, but he would like to think about this right now    7. Need for hepatitis C screening test  -     Hepatitis C Antibody; Future              Follow Up   Return in about 6 months (around 5/22/2022).  Patient was given instructions and counseling regarding his condition or for health maintenance advice. Please see specific information pulled into the AVS if appropriate.

## 2021-11-22 NOTE — TELEPHONE ENCOUNTER
Provider: GEORGE HARRIS    Caller: PAUL    Relationship to Patient: SPOUSE    Phone Number: [phone]    Reason for Call: THE PATIENT'S WIFE STATED THE PATIENT'S COVID VACCINATION RECORD IS INCORRECT AND SHE WOULD LIKE THIS CHANGED. THESE ARE THE CORRECT AND ONLY DATES HE HAS RECEIVED THE VACCINE/BOOSTER.  01/18/21- MODERNA  02/12/21- MODERNA  09/10/21- MODERNA BOOSTER

## 2021-11-22 NOTE — TELEPHONE ENCOUNTER
This has been updated to reflect only the dates from SHEILA Ferrell and Anitra that were entered into [facility].

## 2021-11-24 ENCOUNTER — TELEPHONE (OUTPATIENT)
Dept: INTERNAL MEDICINE | Facility: CLINIC | Age: 71
End: 2021-11-24

## 2021-11-24 RX ORDER — BETAMETHASONE DIPROPIONATE 0.5 MG/G
LOTION TOPICAL 2 TIMES DAILY
Qty: 60 ML | Refills: 1 | OUTPATIENT
Start: 2021-11-24 | End: 2022-05-23 | Stop reason: SDUPTHER

## 2021-11-24 NOTE — TELEPHONE ENCOUNTER
Caller: Chinmay Roque    Relationship: Self    Best call back number: [phone]       What form or medical record are you requesting:  TEST RESULTS    Who is requesting this form or medical record from you: CHINMAY ROQUE    How would you like to receive the form or medical records (pick-up, mail, fax): FAX      ATT:      PAUL  [phone]    Timeframe paperwork needed: ASAP    Additional notes:     PATIENT WOULD LIKE TO KNOW IF RESULTS OF BLOOD WORK CAN BE FAXED TO HIS WIFE'S  WORK AT [employer]    ATTN :  PAUL  [phone]
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
## 2022-02-22 ENCOUNTER — LAB (OUTPATIENT)
Dept: LAB | Facility: HOSPITAL | Age: 72
End: 2022-02-22

## 2022-02-22 ENCOUNTER — OFFICE VISIT (OUTPATIENT)
Dept: UROLOGY | Facility: CLINIC | Age: 72
End: 2022-02-22

## 2022-02-22 VITALS — BODY MASS INDEX: 25.74 KG/M2 | WEIGHT: 211.4 LBS | HEIGHT: 76 IN

## 2022-02-22 DIAGNOSIS — R35.0 BENIGN PROSTATIC HYPERPLASIA WITH URINARY FREQUENCY: Primary | ICD-10-CM

## 2022-02-22 DIAGNOSIS — N40.1 BENIGN PROSTATIC HYPERPLASIA WITH URINARY FREQUENCY: Primary | ICD-10-CM

## 2022-02-22 DIAGNOSIS — R35.0 BENIGN PROSTATIC HYPERPLASIA WITH URINARY FREQUENCY: ICD-10-CM

## 2022-02-22 DIAGNOSIS — N40.1 BENIGN PROSTATIC HYPERPLASIA WITH URINARY FREQUENCY: ICD-10-CM

## 2022-02-22 LAB
PSA SERPL-MCNC: 0.41 NG/ML (ref 0–4)
URINE VOLUME: 100

## 2022-02-22 PROCEDURE — 84153 ASSAY OF PSA TOTAL: CPT

## 2022-02-22 PROCEDURE — 36415 COLL VENOUS BLD VENIPUNCTURE: CPT

## 2022-02-22 PROCEDURE — 99213 OFFICE O/P EST LOW 20 MIN: CPT | Performed by: UROLOGY

## 2022-02-22 PROCEDURE — 51798 US URINE CAPACITY MEASURE: CPT | Performed by: UROLOGY

## 2022-05-23 ENCOUNTER — OFFICE VISIT (OUTPATIENT)
Dept: INTERNAL MEDICINE | Facility: CLINIC | Age: 72
End: 2022-05-23

## 2022-05-23 VITALS
HEART RATE: 67 BPM | RESPIRATION RATE: 18 BRPM | BODY MASS INDEX: 24.77 KG/M2 | TEMPERATURE: 97.8 F | OXYGEN SATURATION: 97 % | HEIGHT: 76 IN | SYSTOLIC BLOOD PRESSURE: 121 MMHG | DIASTOLIC BLOOD PRESSURE: 67 MMHG | WEIGHT: 203.4 LBS

## 2022-05-23 DIAGNOSIS — D50.9 IRON DEFICIENCY ANEMIA, UNSPECIFIED IRON DEFICIENCY ANEMIA TYPE: ICD-10-CM

## 2022-05-23 DIAGNOSIS — R33.9 URINARY RETENTION: ICD-10-CM

## 2022-05-23 DIAGNOSIS — H93.13 TINNITUS OF BOTH EARS: Primary | ICD-10-CM

## 2022-05-23 PROCEDURE — 99213 OFFICE O/P EST LOW 20 MIN: CPT | Performed by: INTERNAL MEDICINE

## 2022-05-23 RX ORDER — SILDENAFIL CITRATE 20 MG/1
20 TABLET ORAL DAILY PRN
Qty: 90 TABLET | Refills: 1 | Status: SHIPPED | OUTPATIENT
Start: 2022-05-23

## 2022-05-23 RX ORDER — BETAMETHASONE DIPROPIONATE 0.5 MG/G
LOTION TOPICAL 2 TIMES DAILY
Qty: 180 ML | Refills: 1 | Status: SHIPPED | OUTPATIENT
Start: 2022-05-23 | End: 2023-03-06

## 2022-05-23 RX ORDER — ETODOLAC 400 MG/1
400 TABLET, EXTENDED RELEASE ORAL 2 TIMES DAILY WITH MEALS
Qty: 180 TABLET | Refills: 1 | Status: SHIPPED | OUTPATIENT
Start: 2022-05-23

## 2022-05-23 RX ORDER — NYSTATIN AND TRIAMCINOLONE ACETONIDE 100000; 1 [USP'U]/G; MG/G
1 OINTMENT TOPICAL 2 TIMES DAILY
Qty: 270 G | Refills: 2 | Status: SHIPPED | OUTPATIENT
Start: 2022-05-23 | End: 2022-05-27
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
## 2022-05-26 ENCOUNTER — TELEPHONE (OUTPATIENT)
Dept: INTERNAL MEDICINE | Facility: CLINIC | Age: 72
End: 2022-05-26

## 2022-05-26 NOTE — TELEPHONE ENCOUNTER
Caller: PAUL GILLIS    Relationship: Emergency Contact    Best call back number: [phone]    Who are you requesting to speak with (clinical staff, provider,  specific staff member): ADRYAN    What was the call regarding:  PATIENTS WIFE SPOKE WITH ADRYAN EARLIER. SHE WOULD LIKE TO SPEAK WITH ADRYAN AGAIN. ATTEMPTED TO WARM TRANSFER. PLEASE CALL PATIENTS WIFE.

## 2022-05-26 NOTE — TELEPHONE ENCOUNTER
Called patient's wife to gather some more information about a PA that had been sent over from [facility]. Martha his wife said she was not sure why they were sending it because it has always been covered by there insurance. His wife is going to call [facility] and get back with us.

## 2022-05-27 RX ORDER — NYSTATIN AND TRIAMCINOLONE ACETONIDE 100000; 1 [USP'U]/G; MG/G
1 OINTMENT TOPICAL 2 TIMES DAILY
Qty: 180 G | Refills: 4 | Status: SHIPPED | OUTPATIENT
Start: 2022-05-27

## 2022-05-27 NOTE — TELEPHONE ENCOUNTER
Red rule verified and correct.    Pt needing Rx to be sent through for 180 g not 270 g. Ins will pay for the 180 g.      Rx resent to mail order and amt is equal to original Rx.    Requested Prescriptions     Signed Prescriptions Disp Refills   • nystatin-triamcinolone (MYCOLOG) 758754-4.1 UNIT/GM-% ointment 180 g 4     Sig: Apply 1 application topically to the appropriate area as directed 2 (Two) Times a Day.     Authorizing Provider: GEORGE HARRIS     Ordering User: JANETTE LOBATO

## 2022-11-05 RX ORDER — IRON PS COMPLEX/B12/FOLIC ACID 150-25-1
CAPSULE ORAL
Qty: 180 CAPSULE | Refills: 1 | Status: SHIPPED | OUTPATIENT
Start: 2022-11-05

## 2023-02-01 ENCOUNTER — TELEPHONE (OUTPATIENT)
Dept: INTERNAL MEDICINE | Facility: CLINIC | Age: 73
End: 2023-02-01
Payer: MEDICARE

## 2023-02-01 DIAGNOSIS — H93.19 TINNITUS, UNSPECIFIED LATERALITY: Primary | ICD-10-CM

## 2023-02-01 NOTE — TELEPHONE ENCOUNTER
Patient's wife would like to see about getting a neuro referral for Tinnitus. They would like to see Dr. Chico Dodd in [place]. Office number there is [phone]. Pended order if this is approved.

## 2023-02-17 ENCOUNTER — TELEPHONE (OUTPATIENT)
Dept: INTERNAL MEDICINE | Facility: CLINIC | Age: 73
End: 2023-02-17
Payer: MEDICARE

## 2023-02-17 NOTE — TELEPHONE ENCOUNTER
Caller: PAUL GILLIS    Relationship: Emergency Contact    Best call back number: [phone]         What specialty or service is being requested: NEUROLOGY     What is the provider, practice or medical service name:  [facility]     FAX :   [phone]       Any additional details:        THE PATIENT'S WIFE IS REQUESTING THE REFERRAL TO DR MARCY SUAREZ TO BE REFAXED TO HIS OFFICE. SHE SAID SHE WAS TOLD THAT THE REFERRAL WAS SENT BUT HIS OFFICE SAID THEY HAVE NOT RECEIVED IT.     SHE IS REQUESTING TO CALL HER ONCE THE REFERRAL HAS BEEN SENT.

## 2023-03-06 RX ORDER — BETAMETHASONE DIPROPIONATE 0.5 MG/G
LOTION TOPICAL
Qty: 180 ML | Refills: 1 | Status: SHIPPED | OUTPATIENT
Start: 2023-03-06

## 2023-05-22 ENCOUNTER — OFFICE VISIT (OUTPATIENT)
Dept: INTERNAL MEDICINE | Facility: CLINIC | Age: 73
End: 2023-05-22
Payer: MEDICARE

## 2023-05-22 VITALS
WEIGHT: 201.8 LBS | BODY MASS INDEX: 24.57 KG/M2 | SYSTOLIC BLOOD PRESSURE: 116 MMHG | OXYGEN SATURATION: 100 % | DIASTOLIC BLOOD PRESSURE: 68 MMHG | TEMPERATURE: 98.6 F | RESPIRATION RATE: 18 BRPM | HEART RATE: 59 BPM | HEIGHT: 76 IN

## 2023-05-22 DIAGNOSIS — H93.13 TINNITUS OF BOTH EARS: Primary | ICD-10-CM

## 2023-05-22 DIAGNOSIS — R79.9 ABNORMAL FINDING OF BLOOD CHEMISTRY, UNSPECIFIED: ICD-10-CM

## 2023-05-22 DIAGNOSIS — R53.83 OTHER FATIGUE: ICD-10-CM

## 2023-05-22 DIAGNOSIS — Z12.5 SCREENING PSA (PROSTATE SPECIFIC ANTIGEN): ICD-10-CM

## 2023-05-22 DIAGNOSIS — R41.3 MEMORY LOSS: ICD-10-CM

## 2023-05-22 DIAGNOSIS — E67.8 OTHER SPECIFIED HYPERALIMENTATION: ICD-10-CM

## 2023-05-22 DIAGNOSIS — D50.9 IRON DEFICIENCY ANEMIA, UNSPECIFIED IRON DEFICIENCY ANEMIA TYPE: ICD-10-CM

## 2023-05-22 LAB
25(OH)D3 SERPL-MCNC: 56 NG/ML (ref 30–100)
ALBUMIN SERPL-MCNC: 4.5 G/DL (ref 3.5–5.2)
ALBUMIN/GLOB SERPL: 1.4 G/DL
ALP SERPL-CCNC: 94 U/L (ref 39–117)
ALT SERPL W P-5'-P-CCNC: 9 U/L (ref 1–41)
ANION GAP SERPL CALCULATED.3IONS-SCNC: 6.5 MMOL/L (ref 5–15)
AST SERPL-CCNC: 17 U/L (ref 1–40)
BASOPHILS # BLD AUTO: 0.04 10*3/MM3 (ref 0–0.2)
BASOPHILS NFR BLD AUTO: 0.8 % (ref 0–1.5)
BILIRUB SERPL-MCNC: 0.5 MG/DL (ref 0–1.2)
BUN SERPL-MCNC: 27 MG/DL (ref 8–23)
BUN/CREAT SERPL: 22.7 (ref 7–25)
CALCIUM SPEC-SCNC: 9.5 MG/DL (ref 8.6–10.5)
CHLORIDE SERPL-SCNC: 104 MMOL/L (ref 98–107)
CHOLEST SERPL-MCNC: 185 MG/DL (ref 0–200)
CO2 SERPL-SCNC: 29.5 MMOL/L (ref 22–29)
CREAT SERPL-MCNC: 1.19 MG/DL (ref 0.76–1.27)
DEPRECATED RDW RBC AUTO: 40.8 FL (ref 37–54)
EGFRCR SERPLBLD CKD-EPI 2021: 64.9 ML/MIN/1.73
EOSINOPHIL # BLD AUTO: 0.33 10*3/MM3 (ref 0–0.4)
EOSINOPHIL NFR BLD AUTO: 6.5 % (ref 0.3–6.2)
ERYTHROCYTE [DISTWIDTH] IN BLOOD BY AUTOMATED COUNT: 12 % (ref 12.3–15.4)
FOLATE SERPL-MCNC: >20 NG/ML (ref 4.78–24.2)
GLOBULIN UR ELPH-MCNC: 3.3 GM/DL
GLUCOSE SERPL-MCNC: 93 MG/DL (ref 65–99)
HCT VFR BLD AUTO: 42.9 % (ref 37.5–51)
HDLC SERPL-MCNC: 42 MG/DL (ref 40–60)
HGB BLD-MCNC: 14.7 G/DL (ref 13–17.7)
IMM GRANULOCYTES # BLD AUTO: 0.02 10*3/MM3 (ref 0–0.05)
IMM GRANULOCYTES NFR BLD AUTO: 0.4 % (ref 0–0.5)
IRON 24H UR-MRATE: 101 MCG/DL (ref 59–158)
IRON SATN MFR SERPL: 27 % (ref 20–50)
LDLC SERPL CALC-MCNC: 119 MG/DL (ref 0–100)
LDLC/HDLC SERPL: 2.78 {RATIO}
LYMPHOCYTES # BLD AUTO: 1.45 10*3/MM3 (ref 0.7–3.1)
LYMPHOCYTES NFR BLD AUTO: 28.5 % (ref 19.6–45.3)
MCH RBC QN AUTO: 31.7 PG (ref 26.6–33)
MCHC RBC AUTO-ENTMCNC: 34.3 G/DL (ref 31.5–35.7)
MCV RBC AUTO: 92.7 FL (ref 79–97)
MONOCYTES # BLD AUTO: 0.44 10*3/MM3 (ref 0.1–0.9)
MONOCYTES NFR BLD AUTO: 8.6 % (ref 5–12)
NEUTROPHILS NFR BLD AUTO: 2.81 10*3/MM3 (ref 1.7–7)
NEUTROPHILS NFR BLD AUTO: 55.2 % (ref 42.7–76)
NRBC BLD AUTO-RTO: 0 /100 WBC (ref 0–0.2)
PLATELET # BLD AUTO: 213 10*3/MM3 (ref 140–450)
PMV BLD AUTO: 10.3 FL (ref 6–12)
POTASSIUM SERPL-SCNC: 4.5 MMOL/L (ref 3.5–5.2)
PROT SERPL-MCNC: 7.8 G/DL (ref 6–8.5)
RBC # BLD AUTO: 4.63 10*6/MM3 (ref 4.14–5.8)
SODIUM SERPL-SCNC: 140 MMOL/L (ref 136–145)
TIBC SERPL-MCNC: 377 MCG/DL (ref 298–536)
TRANSFERRIN SERPL-MCNC: 253 MG/DL (ref 200–360)
TRIGL SERPL-MCNC: 131 MG/DL (ref 0–150)
TSH SERPL DL<=0.05 MIU/L-ACNC: 2.66 UIU/ML (ref 0.27–4.2)
VIT B12 BLD-MCNC: 1478 PG/ML (ref 211–946)
VLDLC SERPL-MCNC: 24 MG/DL (ref 5–40)
WBC NRBC COR # BLD: 5.09 10*3/MM3 (ref 3.4–10.8)

## 2023-05-22 PROCEDURE — 80061 LIPID PANEL: CPT | Performed by: INTERNAL MEDICINE

## 2023-05-22 PROCEDURE — 84443 ASSAY THYROID STIM HORMONE: CPT | Performed by: INTERNAL MEDICINE

## 2023-05-22 PROCEDURE — 82607 VITAMIN B-12: CPT | Performed by: INTERNAL MEDICINE

## 2023-05-22 PROCEDURE — 84466 ASSAY OF TRANSFERRIN: CPT | Performed by: INTERNAL MEDICINE

## 2023-05-22 PROCEDURE — 80053 COMPREHEN METABOLIC PANEL: CPT | Performed by: INTERNAL MEDICINE

## 2023-05-22 PROCEDURE — 82746 ASSAY OF FOLIC ACID SERUM: CPT | Performed by: INTERNAL MEDICINE

## 2023-05-22 PROCEDURE — 85025 COMPLETE CBC W/AUTO DIFF WBC: CPT | Performed by: INTERNAL MEDICINE

## 2023-05-22 PROCEDURE — 83540 ASSAY OF IRON: CPT | Performed by: INTERNAL MEDICINE

## 2023-05-22 PROCEDURE — 82306 VITAMIN D 25 HYDROXY: CPT | Performed by: INTERNAL MEDICINE

## 2023-05-22 RX ORDER — SILDENAFIL CITRATE 20 MG/1
20 TABLET ORAL DAILY PRN
Qty: 90 TABLET | Refills: 1 | Status: SHIPPED | OUTPATIENT
Start: 2023-05-22

## 2023-05-22 RX ORDER — IRON PS COMPLEX/B12/FOLIC ACID 150-25-1
1 CAPSULE ORAL 2 TIMES DAILY
Qty: 180 CAPSULE | Refills: 1 | Status: SHIPPED | OUTPATIENT
Start: 2023-05-22

## 2023-05-22 RX ORDER — BETAMETHASONE DIPROPIONATE 0.5 MG/G
LOTION TOPICAL DAILY
Qty: 180 ML | Refills: 1 | Status: SHIPPED | OUTPATIENT
Start: 2023-05-22

## 2023-05-22 RX ORDER — ETODOLAC 400 MG/1
400 TABLET, EXTENDED RELEASE ORAL 2 TIMES DAILY WITH MEALS
Qty: 180 TABLET | Refills: 1 | Status: SHIPPED | OUTPATIENT
Start: 2023-05-22

## 2023-05-22 RX ORDER — DONEPEZIL HYDROCHLORIDE 5 MG/1
1 TABLET, FILM COATED ORAL DAILY
COMMUNITY
Start: 2023-04-11

## 2023-05-22 RX ORDER — NYSTATIN AND TRIAMCINOLONE ACETONIDE 100000; 1 [USP'U]/G; MG/G
1 OINTMENT TOPICAL 2 TIMES DAILY
Qty: 180 G | Refills: 4 | Status: SHIPPED | OUTPATIENT
Start: 2023-05-22

## 2023-05-22 NOTE — PROGRESS NOTES
Chief Complaint  Medicare Wellness-subsequent and chronic    Subjective          Chinmay Roque presents to [facility]  History of Present Illness     A little concerned with memory  No changes to acute short-term or long-term however having some trouble with midterm memories like vacations that were recently taken etc.  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  EMG did show that he had complete sensory loss they were unable to give him a diagnosis at that time  At that time tinnitus was moderate however in 2006 all of a sudden became severe  At that time was seen at hearing clinic in [place] and tried lidocaine and dexamethasone  This made him significantly dizzy and did not help with the tinnitus at all  His ENT at that time suggested seeing a psychiatrist out of concern for suicide due to severe ringing  The psychiatrist that he works with he just sees yearly the medications that he has been given help him to sleep better but that is about it.      He also lost his sense of smell with initial onset    No known injury    No better  Getting louder and he is having trouble sleeping  The meds he is currently taking do help with sleep  They are the bare minimum to help with sleep that he can function    They went skiing this year and it was very challenging for him.  His wife states it was like he forgot how to ski  He is unsure if this was a memory concern and ear ringing concern or something different    No headache    No chest pain  No trouble breathing    Scheduling with Dr. Rodriguez for colonscopy shortly    Objective   Vital Signs:   /68 (BP Location: Right arm, Patient Position: Sitting, Cuff Size: Adult)   Pulse 59   Temp 98.6 °F (37 °C)   Resp 18   Ht 193  "cm (76\")   Wt 91.5 kg (201 lb 12.8 oz)   SpO2 100%   BMI 24.56 kg/m²     Physical Exam  Vitals reviewed.   Constitutional:       Appearance: Normal appearance. He is well-developed.   HENT:      Head: Normocephalic and atraumatic.      Right Ear: External ear normal.      Left Ear: External ear normal.   Eyes:      Conjunctiva/sclera: Conjunctivae normal.      Pupils: Pupils are equal, round, and reactive to light.   Cardiovascular:      Rate and Rhythm: Normal rate and regular rhythm.      Heart sounds: No murmur heard.    No friction rub. No gallop.   Pulmonary:      Effort: Pulmonary effort is normal.      Breath sounds: Normal breath sounds. No wheezing or rhonchi.   Skin:     General: Skin is warm and dry.   Neurological:      Mental Status: He is alert and oriented to person, place, and time.   Psychiatric:         Mood and Affect: Affect normal.         Behavior: Behavior normal.         Thought Content: Thought content normal.        Result Review :           Results for orders placed or performed in visit on 02/22/22   PSA DIAGNOSTIC    Specimen: Blood   Result Value Ref Range    PSA 0.405 0.000 - 4.000 ng/mL            Procedures        Assessment and Plan    Diagnoses and all orders for this visit:    1. Tinnitus of both ears (Primary)  Comments:  Unfortunately severe and long-term has seen several specialist but this was over 15 years ago.  Would consider hearing aids and seeing if there is anything new   Orders:  -     MRI Brain With & Without Contrast; Future  -     Ambulatory Referral to ENT (Otolaryngology)  -     Comprehensive Metabolic Panel  -     CBC & Differential  -     TSH  -     Lipid Panel    2. Memory loss  Comments:  Will start with MoCA test today will order MRI as well  Orders:  -     MRI Brain With & Without Contrast; Future  -     Comprehensive Metabolic Panel  -     CBC & Differential  -     TSH  -     Lipid Panel    3. Screening PSA (prostate specific " antigen)  Comments:  discussed can stop at age 70 however due to brother with prostate cancer he wants to go ahead and screen and understands can be elevated    4. Iron deficiency anemia, unspecified iron deficiency anemia type  Comments:  We will check blood work today  Orders:  -     Comprehensive Metabolic Panel  -     CBC & Differential  -     TSH  -     Lipid Panel    5. Abnormal finding of blood chemistry, unspecified  -     Lipid Panel  -     Iron Profile    6. Other fatigue  Comments:  Likely related to medications from tinnitus however will check blood work and adjust as necessary  Orders:  -     Iron Profile  -     Vitamin B12 & Folate  -     Vitamin D,25-Hydroxy    7. Other specified hyperalimentation  -     Vitamin D,25-Hydroxy    Other orders  -     etodolac XL (LODINE XL) 400 MG 24 hr tablet; Take 1 tablet by mouth 2 (Two) Times a Day With Meals.  Dispense: 180 tablet; Refill: 1  -     polysacchar iron-FA-B12 (Ferrex 150 Forte) 150-1-25 MG-MG-MCG capsule capsule; Take 1 capsule by mouth 2 (Two) Times a Day.  Dispense: 180 capsule; Refill: 1  -     sildenafil (REVATIO) 20 MG tablet; Take 1 tablet by mouth Daily As Needed (Sexual Activity). Take 2-4 tablets 1 hour prior to sexual activity as needed  Dispense: 90 tablet; Refill: 1  -     betamethasone dipropionate 0.05 % lotion; Apply  topically to the appropriate area as directed Daily.  Dispense: 180 mL; Refill: 1  -     nystatin-triamcinolone (MYCOLOG) 332733-4.1 UNIT/GM-% ointment; Apply 1 application topically to the appropriate area as directed 2 (Two) Times a Day.  Dispense: 180 g; Refill: 4        {BMI is within normal parameters. No other follow-up for BMI required.            Follow Up   Return in about 2 months (around 7/22/2023).  Patient was given instructions and counseling regarding his condition or for health maintenance advice. Please see specific information pulled into the AVS if appropriate.

## 2023-05-22 NOTE — PROGRESS NOTES
The ABCs of the Annual Wellness Visit  Subsequent Medicare Wellness Visit    Subjective      Chinmay Roque is a 72 y.o. male who presents for a Subsequent Medicare Wellness Visit.    The following portions of the patient's history were reviewed and   updated as appropriate: allergies, current medications, past family history, past medical history, past social history, past surgical history and problem list.     Compared to one year ago, the patient feels his physical   health is the same.    Compared to one year ago, the patient feels his mental   health is the same.    Recent Hospitalizations:  He was not admitted to the hospital during the last year.       Current Medical Providers:  Patient Care Team:  Taylor Blake MD as PCP - General (Internal Medicine)    Outpatient Medications Prior to Visit   Medication Sig Dispense Refill   • aspirin 81 MG chewable tablet Chew 1 tablet Daily. 90 tablet 1   • B Complex Vitamins (VITAMIN B COMPLEX 100 IJ) vitamin B complex oral capsule take 1 capsule by oral route daily   Active     • betamethasone dipropionate 0.05 % lotion APPLY TOPICALLY TO THE     APPROPRIATE AREA AS        DIRECTED TWO TIMES A  mL 1   • clonazePAM (KlonoPIN) 1 MG tablet 1 tablet Every Night.     • donepezil (ARICEPT) 5 MG tablet Take 1 tablet by mouth Daily.     • etodolac XL (LODINE XL) 400 MG 24 hr tablet Take 1 tablet by mouth 2 (Two) Times a Day With Meals. 180 tablet 1   • Ferrex 150 Forte 150-1-25 MG-MG-MCG capsule capsule TAKE 1 CAPSULE BY MOUTH TWICE DAILY 180 capsule 1   • nystatin-triamcinolone (MYCOLOG) 225957-1.1 UNIT/GM-% ointment Apply 1 application topically to the appropriate area as directed 2 (Two) Times a Day. 180 g 4   • QUEtiapine (SEROquel) 400 MG tablet Take 1-2 tablets by mouth Every Night.     • sildenafil (REVATIO) 20 MG tablet Take 1 tablet by mouth Daily As Needed (Sexual Activity). Take 2-4 tablets 1 hour prior to sexual activity as needed 90 tablet 1   •  "ferrous sulfate 325 (65 FE) MG tablet Take 1 tablet by mouth 2 (Two) Times a Day.     • Santyl 250 UNIT/GM ointment APPLY TO THE AFFECTED AREA(S) TWICE DAILY       No facility-administered medications prior to visit.       No opioid medication identified on active medication list. I have reviewed chart for other potential  high risk medication/s and harmful drug interactions in the elderly.          Aspirin is on active medication list. Aspirin use is indicated based on review of current medical condition/s. Pros and cons of this therapy have been discussed today. Benefits of this medication outweigh potential harm.  Patient has been encouraged to continue taking this medication.  .      Patient Active Problem List   Diagnosis   • Benign prostatic hyperplasia with urinary frequency   • Urinary retention   • Tinnitus   • Iron deficiency anemia     Advance Care Planning   Advance Care Planning     Advance Directive is on file.  ACP discussion was held with the patient during this visit. Patient has an advance directive in EMR which is still valid.      Objective    Vitals:    05/22/23 0750   BP: 116/68   BP Location: Right arm   Patient Position: Sitting   Cuff Size: Adult   Pulse: 59   Resp: 18   Temp: 98.6 °F (37 °C)   SpO2: 100%   Weight: 91.5 kg (201 lb 12.8 oz)   Height: 193 cm (76\")     Estimated body mass index is 24.56 kg/m² as calculated from the following:    Height as of this encounter: 193 cm (76\").    Weight as of this encounter: 91.5 kg (201 lb 12.8 oz).    BMI is within normal parameters. No other follow-up for BMI required.      Does the patient have evidence of cognitive impairment?   discussing at this visit            HEALTH RISK ASSESSMENT    Smoking Status:  Social History     Tobacco Use   Smoking Status Never   Smokeless Tobacco Never     Alcohol Consumption:  Social History     Substance and Sexual Activity   Alcohol Use Yes    Comment: current some day 05/25/2021 occasonally      Fall Risk " Screen:    MIKE Fall Risk Assessment was completed, and patient is at LOW risk for falls.Assessment completed on:2023    Depression Screenin/22/2023     7:55 AM   PHQ-2/PHQ-9 Depression Screening   Little Interest or Pleasure in Doing Things 0-->not at all   Feeling Down, Depressed or Hopeless 0-->not at all   PHQ-9: Brief Depression Severity Measure Score 0       Health Habits and Functional and Cognitive Screenin/22/2023     7:00 AM   Functional & Cognitive Status   Do you have difficulty preparing food and eating? No   Do you have difficulty bathing yourself, getting dressed or grooming yourself? No   Do you have difficulty using the toilet? No   Do you have difficulty moving around from place to place? No   Do you have trouble with steps or getting out of a bed or a chair? No   Current Diet Well Balanced Diet   Dental Exam Up to date   Eye Exam Not up to date   Exercise (times per week) 6 times per week   Current Exercises Include Walking        Exercise Comment Farming   Do you need help using the phone?  No   Are you deaf or do you have serious difficulty hearing?  No   Do you need help with transportation? No   Do you need help shopping? No   Do you need help preparing meals?  No   Do you need help with housework?  No   Do you need help with laundry? No   Do you need help taking your medications? No   Do you need help managing money? No   Do you ever drive or ride in a car without wearing a seat belt? No   Have you felt unusual stress, anger or loneliness in the last month? No   Who do you live with? Spouse   If you need help, do you have trouble finding someone available to you? No   Have you been bothered in the last four weeks by sexual problems? No   Do you have difficulty concentrating, remembering or making decisions? Yes       Age-appropriate Screening Schedule:  Refer to the list below for future screening recommendations based on patient's age, sex and/or medical conditions.  Orders for these recommended tests are listed in the plan section. The patient has been provided with a written plan.    Health Maintenance   Topic Date Due   • ANNUAL WELLNESS VISIT  11/22/2022   • COLORECTAL CANCER SCREENING  01/23/2023   • INFLUENZA VACCINE  08/01/2023   • TDAP/TD VACCINES (3 - Td or Tdap) 04/11/2030   • HEPATITIS C SCREENING  Completed   • COVID-19 Vaccine  Completed   • Pneumococcal Vaccine 65+  Completed   • ZOSTER VACCINE  Completed                  CMS Preventative Services Quick Reference  Risk Factors Identified During Encounter:    None Identified  Vision Screening Recommended    The above risks/problems have been discussed with the patient.  Pertinent information has been shared with the patient in the After Visit Summary.    There are no diagnoses linked to this encounter.    Follow Up:   Next Medicare Wellness visit to be scheduled in 1 year.      An After Visit Summary and PPPS were made available to the patient.

## 2023-05-22 NOTE — Clinical Note
Brent Onofre, I am sending you this patient to discuss severe tinnitus.  He has has great work up, but this was 15 years ago.  I don't know that there is anything new that you might be able to offer him.  We discussed the possibility of trying hearing aids that will ring back at the level of his tinnitus which she has not tried before.  I did not know if maybe there was an audiologist that you have worked with for this?  Also thought about sending him to Dr. Mark Severtson who has done a fellowship in ear?  Would appreciate any advice you have or anything you can do for this wonderful gentleman.  (Also I'm ordering the MRI more for memory than for ear which is why I'm doing brain rather than ear imaging.) Thanks! Taylor
Statement Selected

## 2023-05-25 ENCOUNTER — TELEPHONE (OUTPATIENT)
Dept: INTERNAL MEDICINE | Facility: CLINIC | Age: 73
End: 2023-05-25
Payer: MEDICARE

## 2023-05-25 NOTE — TELEPHONE ENCOUNTER
Caller: PAUL GILLIS ON [employer] BUT NO BOXES ARE CHECKED    Relationship: Emergency Contact    Best call back number: [phone]     What form or medical record are you requesting: COPY OF BLOODWORK FROM 5.22.23    Who is requesting this form or medical record from you: DR LIBERTY VASQUEZ IN [place]    How would you like to receive the form or medical records (pick-up, mail, fax):   If pick-up, provide patient with address and location details    Timeframe paperwork needed: ASAP    Additional notes: PATIENT'S WIFE STATES THAT SHE WOULD LIKE TO PICK THIS UP BEFORE WORK TOMORROW. PLEASE CALL WHEN THIS IS READY.

## 2023-05-26 ENCOUNTER — TELEPHONE (OUTPATIENT)
Dept: INTERNAL MEDICINE | Facility: CLINIC | Age: 73
End: 2023-05-26
Payer: MEDICARE

## 2023-05-26 DIAGNOSIS — Z12.5 SCREENING PSA (PROSTATE SPECIFIC ANTIGEN): Primary | ICD-10-CM

## 2023-05-26 NOTE — TELEPHONE ENCOUNTER
PT spouse stopped by office to  copy of 5/22/23 lab flow sheet.     She requested PSA orders for pt. Last checked 2/22. Pt brother has history of prostate cancer. Next OV 2 month follow up 8/28/23.

## 2023-05-29 NOTE — TELEPHONE ENCOUNTER
Tell her I am sorry.  I thought I had ordered it at his appointment and it looks like I partially ordered it but not fully; they can go have it drawn anytime.

## 2023-05-30 ENCOUNTER — CLINICAL SUPPORT (OUTPATIENT)
Dept: INTERNAL MEDICINE | Facility: CLINIC | Age: 73
End: 2023-05-30

## 2023-05-30 DIAGNOSIS — Z12.5 SCREENING PSA (PROSTATE SPECIFIC ANTIGEN): ICD-10-CM

## 2023-05-30 LAB — PSA SERPL-MCNC: 0.49 NG/ML (ref 0–4)

## 2023-05-30 PROCEDURE — 36415 COLL VENOUS BLD VENIPUNCTURE: CPT | Performed by: INTERNAL MEDICINE

## 2023-05-30 PROCEDURE — G0103 PSA SCREENING: HCPCS | Performed by: INTERNAL MEDICINE

## 2023-05-30 NOTE — PROGRESS NOTES
Venipuncture Blood Specimen Collection  Venipuncture performed in left AC by Shanna Edwards MA with good hemostasis. Patient tolerated the procedure well without complications.   05/30/23   Shanna Edwards MA

## 2023-06-21 PROBLEM — Z12.11 ENCOUNTER FOR SCREENING FOR MALIGNANT NEOPLASM OF COLON: Status: ACTIVE | Noted: 2023-06-21

## 2023-06-21 PROBLEM — Z86.010 PERSONAL HISTORY OF COLONIC POLYPS: Status: ACTIVE | Noted: 2023-06-21

## 2023-06-21 PROBLEM — Z86.0100 PERSONAL HISTORY OF COLONIC POLYPS: Status: ACTIVE | Noted: 2023-06-21

## 2023-08-28 ENCOUNTER — OFFICE VISIT (OUTPATIENT)
Dept: INTERNAL MEDICINE | Facility: CLINIC | Age: 73
End: 2023-08-28
Payer: MEDICARE

## 2023-08-28 VITALS
WEIGHT: 208.8 LBS | HEIGHT: 76 IN | BODY MASS INDEX: 25.43 KG/M2 | SYSTOLIC BLOOD PRESSURE: 122 MMHG | HEART RATE: 66 BPM | TEMPERATURE: 97.3 F | DIASTOLIC BLOOD PRESSURE: 66 MMHG | OXYGEN SATURATION: 98 %

## 2023-08-28 DIAGNOSIS — D50.9 IRON DEFICIENCY ANEMIA, UNSPECIFIED IRON DEFICIENCY ANEMIA TYPE: ICD-10-CM

## 2023-08-28 DIAGNOSIS — H93.13 TINNITUS OF BOTH EARS: Primary | ICD-10-CM

## 2023-08-28 DIAGNOSIS — R41.3 MEMORY DEFICIT: ICD-10-CM

## 2023-08-28 DIAGNOSIS — F01.A0 MILD VASCULAR DEMENTIA WITHOUT BEHAVIORAL DISTURBANCE, PSYCHOTIC DISTURBANCE, MOOD DISTURBANCE, OR ANXIETY: ICD-10-CM

## 2023-08-28 RX ORDER — BETAMETHASONE DIPROPIONATE 0.5 MG/G
LOTION TOPICAL DAILY
Qty: 180 ML | Refills: 1 | Status: SHIPPED | OUTPATIENT
Start: 2023-08-28 | End: 2023-08-31 | Stop reason: SDUPTHER

## 2023-08-28 RX ORDER — IRON PS COMPLEX/B12/FOLIC ACID 150-25-1
1 CAPSULE ORAL 2 TIMES DAILY
Qty: 180 CAPSULE | Refills: 1 | Status: SHIPPED | OUTPATIENT
Start: 2023-08-28 | End: 2023-08-31 | Stop reason: SDUPTHER

## 2023-08-28 RX ORDER — NYSTATIN AND TRIAMCINOLONE ACETONIDE 100000; 1 [USP'U]/G; MG/G
1 OINTMENT TOPICAL 2 TIMES DAILY
Qty: 180 G | Refills: 4 | Status: SHIPPED | OUTPATIENT
Start: 2023-08-28 | End: 2023-08-31 | Stop reason: SDUPTHER

## 2023-08-28 RX ORDER — DONEPEZIL HYDROCHLORIDE 10 MG/1
10 TABLET, FILM COATED ORAL DAILY
Qty: 90 TABLET | Refills: 1 | Status: SHIPPED | OUTPATIENT
Start: 2023-08-28 | End: 2023-08-31 | Stop reason: SDUPTHER

## 2023-08-28 RX ORDER — AMOXICILLIN AND CLAVULANATE POTASSIUM 875; 125 MG/1; MG/1
1 TABLET, FILM COATED ORAL 2 TIMES DAILY
Qty: 10 TABLET | Refills: 0 | Status: SHIPPED | OUTPATIENT
Start: 2023-08-28 | End: 2023-08-31 | Stop reason: SDUPTHER

## 2023-08-28 RX ORDER — ETODOLAC 400 MG/1
400 TABLET, EXTENDED RELEASE ORAL 2 TIMES DAILY WITH MEALS
Qty: 180 TABLET | Refills: 1 | Status: SHIPPED | OUTPATIENT
Start: 2023-08-28 | End: 2023-08-31 | Stop reason: SDUPTHER

## 2023-08-28 RX ORDER — SILDENAFIL CITRATE 20 MG/1
20 TABLET ORAL DAILY PRN
Qty: 90 TABLET | Refills: 1 | Status: SHIPPED | OUTPATIENT
Start: 2023-08-28 | End: 2023-08-31 | Stop reason: SDUPTHER

## 2023-08-28 NOTE — PROGRESS NOTES
"Chief Complaint  Med Refill (Pt requesting increase in Donepezil to 10 mg since he has been taking for the last 4 months.)    Subjective      Chinmay Roque presents to [facility]  History of Present Illness    Patient is here for routine follow-up appointment.  Patient would like to increase Aricept dose to 10 mg.  Currently doing well on 5 mg dose.  Patient has no other questions or concerns.    Patient has colonoscopy scheduled for November.  Tinnitus continues, slightly got worse but back to baseline.  Has appointment with ENT and audiologist September 11.   On Klonopin 1 mg nightly and Seroquel 400-800 mg nightly, tolerating well and sleeping well.  Denies chest pain, dyspnea, dysphagia, abdominal pain, constipation.    Reviewed MRI    Objective   Vital Signs:   /66 (BP Location: Left arm, Patient Position: Sitting, Cuff Size: Adult)   Pulse 66   Temp 97.3 øF (36.3 øC) (Temporal)   Ht 193 cm (76\")   Wt 94.7 kg (208 lb 12.8 oz)   SpO2 98%   BMI 25.42 kg/mý     Wt Readings from Last 3 Encounters:   08/28/23 94.7 kg (208 lb 12.8 oz)   05/22/23 91.5 kg (201 lb 12.8 oz)   05/23/22 92.3 kg (203 lb 6.4 oz)     BP Readings from Last 3 Encounters:   08/28/23 122/66   05/22/23 116/68   05/23/22 121/67         Physical Exam  Constitutional:       Appearance: Normal appearance.   HENT:      Head: Normocephalic and atraumatic.      Right Ear: Tympanic membrane, ear canal and external ear normal.      Left Ear: Tympanic membrane, ear canal and external ear normal.      Nose: Nose normal.      Mouth/Throat:      Mouth: Mucous membranes are moist.      Pharynx: Oropharynx is clear. No posterior oropharyngeal erythema.   Eyes:      Conjunctiva/sclera: Conjunctivae normal.   Cardiovascular:      Rate and Rhythm: Normal rate.      Pulses: Normal pulses.      Heart sounds: Normal heart sounds.   Pulmonary:      Effort: Pulmonary effort is normal.      Breath sounds: " Normal breath sounds.   Lymphadenopathy:      Cervical: No cervical adenopathy.   Skin:     General: Skin is warm and dry.   Neurological:      Mental Status: He is alert and oriented to person, place, and time.   Psychiatric:         Mood and Affect: Mood normal.         Behavior: Behavior normal.         Thought Content: Thought content normal.         Judgment: Judgment normal.        Result Review :  The following data was reviewed by: Taylor Blake MD on 08/28/2023:    LABS      Common labs          5/22/2023    09:16 5/30/2023    10:52 7/11/2023    10:01   Common Labs   Glucose 93      BUN 27      Creatinine 1.19   1.20    Sodium 140      Potassium 4.5      Chloride 104      Calcium 9.5      Albumin 4.5      Total Bilirubin 0.5      Alkaline Phosphatase 94      AST (SGOT) 17      ALT (SGPT) 9      WBC 5.09      Hemoglobin 14.7      Hematocrit 42.9      Platelets 213      Total Cholesterol 185      Triglycerides 131      HDL Cholesterol 42      LDL Cholesterol  119      PSA  0.493         No visits with results within 1 Month(s) from this visit.   Latest known visit with results is:   Hospital Outpatient Visit on 07/11/2023   Component Date Value    Creatinine 07/11/2023 1.20     eGFR 07/11/2023 64.3        Lab Results   Component Value Date    BILIRUBINUR Large 06/01/2021        IMAGING  MRI Brain With & Without Contrast    Result Date: 7/11/2023    MR examination of the brain without and with IV contrast demonstrating mild atrophy and white matter changes.      GIGI ESCALANTE MD       Electronically Signed and Approved By: GIGI ESCALANTE MD on 7/11/2023 at 11:16               Procedures         HEALTH MAINTENANCE          Social History     Tobacco Use   Smoking Status Never    Passive exposure: Never   Smokeless Tobacco Never              Assessment and Plan   Diagnoses and all orders for this visit:    1. Tinnitus of both ears (Primary)  Assessment & Plan:  -Appointment with ENT and audiology in  September.  -Considered trying Gabapentin, however has tried it in the past and did not help.   -Could try Klonopin during the day to see if tinnitus improves or is more tolerable during the day   -Discussed MRI findings, which did demonstrate parasinal thickening, possibly sinus infection can be contributing to worsening tinnitus. Will prescribe Augmentin to trial when tinnitus worsens.         2. Iron deficiency anemia, unspecified iron deficiency anemia type  Assessment & Plan:  On iron supplement, tolerating well.      3. Memory deficit  Assessment & Plan:  -Doing well on Aricept 5 mg, however not seeing improvement. Will increase to 10 mg dose. Discussed side effects at higher dose   -Brain MRI showed atrophy, concerning for possible Alzheimer, discussed long term plan        4. Mild vascular dementia without behavioral disturbance, psychotic disturbance, mood disturbance, or anxiety  -     CT Angiogram Neck With & Without Contrast; Future    Other orders  -     betamethasone dipropionate 0.05 % lotion; Apply  topically to the appropriate area as directed Daily.  Dispense: 180 mL; Refill: 1  -     nystatin-triamcinolone (MYCOLOG) 893506-2.1 UNIT/GM-% ointment; Apply 1 application  topically to the appropriate area as directed 2 (Two) Times a Day.  Dispense: 180 g; Refill: 4  -     donepezil (ARICEPT) 10 MG tablet; Take 1 tablet by mouth Daily.  Dispense: 90 tablet; Refill: 1  -     etodolac XL (LODINE XL) 400 MG 24 hr tablet; Take 1 tablet by mouth 2 (Two) Times a Day With Meals.  Dispense: 180 tablet; Refill: 1  -     polysacchar iron-FA-B12 (Ferrex 150 Forte) 150-1-25 MG-MG-MCG capsule capsule; Take 1 capsule by mouth 2 (Two) Times a Day.  Dispense: 180 capsule; Refill: 1  -     sildenafil (REVATIO) 20 MG tablet; Take 1 tablet by mouth Daily As Needed (Sexual Activity). Take 2-4 tablets 1 hour prior to sexual activity as needed  Dispense: 90 tablet; Refill: 1  -     amoxicillin-clavulanate (AUGMENTIN) 875-125  MG per tablet; Take 1 tablet by mouth 2 (Two) Times a Day.  Dispense: 10 tablet; Refill: 0              FOLLOW UP  Return in about 4 months (around 12/28/2023) for Recheck.  Patient was given instructions and counseling regarding his condition or for health maintenance advice. Please see specific information pulled into the AVS if appropriate.       Taylor Blake MD  08/28/23  10:38 EDT    CURRENT & DISCONTINUED MEDICATIONS  Current Outpatient Medications   Medication Instructions    amoxicillin-clavulanate (AUGMENTIN) 875-125 MG per tablet 1 tablet, Oral, 2 Times Daily    aspirin 81 mg, Oral, Daily    B Complex Vitamins (VITAMIN B COMPLEX 100 IJ) vitamin B complex oral capsule take 1 capsule by oral route daily   Active    betamethasone dipropionate 0.05 % lotion Topical, Daily    clonazePAM (KLONOPIN) 1 mg, Nightly    donepezil (ARICEPT) 10 mg, Oral, Daily    etodolac XL (LODINE XL) 400 mg, Oral, 2 Times Daily With Meals    nystatin-triamcinolone (MYCOLOG) 939538-6.1 UNIT/GM-% ointment 1 application , Topical, 2 Times Daily    polysacchar iron-FA-B12 (Ferrex 150 Forte) 150-1-25 MG-MG-MCG capsule capsule 1 capsule, Oral, 2 Times Daily    QUEtiapine (SEROQUEL) 400-800 mg, Oral, Nightly    sildenafil (REVATIO) 20 mg, Oral, Daily PRN, Take 2-4 tablets 1 hour prior to sexual activity as needed    sodium-potassium-magnesium sulfates (Suprep Bowel Prep Kit) 17.5-3.13-1.6 GM/177ML solution oral solution 1 bottle, Oral, Every 12 Hours       Medications Discontinued During This Encounter   Medication Reason    betamethasone dipropionate 0.05 % lotion Reorder    nystatin-triamcinolone (MYCOLOG) 329190-9.1 UNIT/GM-% ointment Reorder    sildenafil (REVATIO) 20 MG tablet Reorder    donepezil (ARICEPT) 5 MG tablet Reorder    etodolac XL (LODINE XL) 400 MG 24 hr tablet Reorder    polysacchar iron-FA-B12 (Ferrex 150 Forte) 150-1-25 MG-MG-MCG capsule capsule Reorder         Answers submitted by the patient for this  visit:  Primary Reason for Visit (Submitted on 8/21/2023)  What is the primary reason for your visit?: Other  Other (Submitted on 8/21/2023)  Please describe your symptoms.: follow up from previous appointment  Have you had these symptoms before?: Yes  How long have you been having these symptoms?: Greater than 2 weeks  Please describe any probable cause for these symptoms. : uncertain

## 2023-08-28 NOTE — ASSESSMENT & PLAN NOTE
-Doing well on Aricept 5 mg, however not seeing improvement. Will increase to 10 mg dose. Discussed side effects at higher dose   -Brain MRI showed atrophy, concerning for possible Alzheimer, discussed long term plan

## 2023-08-28 NOTE — ASSESSMENT & PLAN NOTE
-Appointment with ENT and audiology in September.  -Considered trying Gabapentin, however has tried it in the past and did not help.   -Could try Klonopin during the day to see if tinnitus improves or is more tolerable during the day   -Discussed MRI findings, which did demonstrate parasinal thickening, possibly sinus infection can be contributing to worsening tinnitus. Will prescribe Augmentin to trial when tinnitus worsens.

## 2023-08-31 ENCOUNTER — TELEPHONE (OUTPATIENT)
Dept: INTERNAL MEDICINE | Facility: CLINIC | Age: 73
End: 2023-08-31
Payer: MEDICARE

## 2023-08-31 DIAGNOSIS — D50.9 IRON DEFICIENCY ANEMIA, UNSPECIFIED IRON DEFICIENCY ANEMIA TYPE: ICD-10-CM

## 2023-08-31 DIAGNOSIS — D50.9 IRON DEFICIENCY ANEMIA, UNSPECIFIED IRON DEFICIENCY ANEMIA TYPE: Primary | ICD-10-CM

## 2023-08-31 DIAGNOSIS — R41.3 MEMORY DEFICIT: ICD-10-CM

## 2023-08-31 RX ORDER — BETAMETHASONE DIPROPIONATE 0.5 MG/G
LOTION TOPICAL DAILY
Qty: 180 ML | Refills: 1 | Status: SHIPPED | OUTPATIENT
Start: 2023-08-31

## 2023-08-31 RX ORDER — AMOXICILLIN AND CLAVULANATE POTASSIUM 875; 125 MG/1; MG/1
1 TABLET, FILM COATED ORAL 2 TIMES DAILY
Qty: 10 TABLET | Refills: 0 | Status: SHIPPED | OUTPATIENT
Start: 2023-08-31

## 2023-08-31 RX ORDER — SILDENAFIL CITRATE 20 MG/1
20 TABLET ORAL DAILY PRN
Qty: 90 TABLET | Refills: 1 | Status: SHIPPED | OUTPATIENT
Start: 2023-08-31

## 2023-08-31 RX ORDER — IRON PS COMPLEX/B12/FOLIC ACID 150-25-1
1 CAPSULE ORAL 2 TIMES DAILY
Qty: 180 CAPSULE | Refills: 1 | Status: SHIPPED | OUTPATIENT
Start: 2023-08-31

## 2023-08-31 RX ORDER — ETODOLAC 400 MG/1
400 TABLET, EXTENDED RELEASE ORAL 2 TIMES DAILY WITH MEALS
Qty: 180 TABLET | Refills: 1 | Status: SHIPPED | OUTPATIENT
Start: 2023-08-31

## 2023-08-31 RX ORDER — DONEPEZIL HYDROCHLORIDE 10 MG/1
10 TABLET, FILM COATED ORAL DAILY
Qty: 90 TABLET | Refills: 1 | Status: SHIPPED | OUTPATIENT
Start: 2023-08-31

## 2023-08-31 RX ORDER — NYSTATIN AND TRIAMCINOLONE ACETONIDE 100000; 1 [USP'U]/G; MG/G
1 OINTMENT TOPICAL 2 TIMES DAILY
Qty: 180 G | Refills: 4 | Status: SHIPPED | OUTPATIENT
Start: 2023-08-31

## 2023-08-31 NOTE — TELEPHONE ENCOUNTER
Patients pharmacy called and wanted clarification on on medication Ferrex 150 Forte. Script is written as twice daily but the recommended dose is once daily. Pharmacy needs clarification.    Call back information: [phone] Option 2 Reference # [id number]

## 2023-09-11 ENCOUNTER — OFFICE VISIT (OUTPATIENT)
Dept: OTOLARYNGOLOGY | Facility: CLINIC | Age: 73
End: 2023-09-11
Payer: MEDICARE

## 2023-09-11 ENCOUNTER — PROCEDURE VISIT (OUTPATIENT)
Dept: OTOLARYNGOLOGY | Facility: CLINIC | Age: 73
End: 2023-09-11
Payer: MEDICARE

## 2023-09-11 VITALS
HEART RATE: 74 BPM | SYSTOLIC BLOOD PRESSURE: 111 MMHG | BODY MASS INDEX: 25.13 KG/M2 | TEMPERATURE: 97.4 F | DIASTOLIC BLOOD PRESSURE: 63 MMHG | WEIGHT: 206.4 LBS | HEIGHT: 76 IN

## 2023-09-11 DIAGNOSIS — H90.A22 SENSORINEURAL HEARING LOSS (SNHL) OF LEFT EAR WITH RESTRICTED HEARING OF RIGHT EAR: ICD-10-CM

## 2023-09-11 DIAGNOSIS — H90.A21 SENSORINEURAL HEARING LOSS (SNHL) OF RIGHT EAR WITH RESTRICTED HEARING OF LEFT EAR: ICD-10-CM

## 2023-09-11 DIAGNOSIS — H93.13 TINNITUS OF BOTH EARS: Primary | ICD-10-CM

## 2023-09-11 DIAGNOSIS — H90.3 SENSORINEURAL HEARING LOSS (SNHL) OF BOTH EARS: ICD-10-CM

## 2023-09-11 PROCEDURE — 92557 COMPREHENSIVE HEARING TEST: CPT | Performed by: AUDIOLOGIST

## 2023-09-11 PROCEDURE — 92567 TYMPANOMETRY: CPT | Performed by: AUDIOLOGIST

## 2023-09-11 PROCEDURE — 1159F MED LIST DOCD IN RCRD: CPT | Performed by: OTOLARYNGOLOGY

## 2023-09-11 PROCEDURE — 1160F RVW MEDS BY RX/DR IN RCRD: CPT | Performed by: OTOLARYNGOLOGY

## 2023-09-11 PROCEDURE — 99203 OFFICE O/P NEW LOW 30 MIN: CPT | Performed by: OTOLARYNGOLOGY
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
## 2023-09-11 NOTE — PROGRESS NOTES
AUDIOMETRIC EVALUATION      Name:  Chinmay Roque  :  1950  Age:  73 y.o.  Date of Evaluation:  2023       History:  Mr. Roque is seen today for a hearing evaluation due to tinnitus each ear.    Audiologic Information:  Concerns for Hearing: Mild concern  PETs: No  Other otologic surgical history: No  Aural Pressure/Fullness: No  Otalgia: No  Otorrhea: No  Tinnitus: Yes bilaterally  Dizziness: No  Noise Exposure: No  Family history of hearing loss: No  Head trauma requiring hospital stay: No  Chemotherapy: No  Other significant history: No    **Case history obtained in office and through EMR system      EVALUATION:    See audiogram    RESULTS:    Otoscopic Evaluation:        NOTE: Testing completed after ears were examined by Dr. Onofre.    Tympanometry (226 Hz):  Right: Type A  Left: Type A    IMPRESSIONS:  Pure tone thresholds for the right ear shows normal hearing at 0.25 K to 1K hertz.  Mild sensorineural hearing loss at 2K hertz.  Severe sensorineural hearing loss 3K to 8K hertz.  Pure tone thresholds for the left ear shows normal hearing 0.25 K and point 5K hertz.  Mild sensorineural hearing loss at 1K in 2K hertz.  Severe sensorineural hearing loss 3K to 8K hertz.  Patient was counseled with regard to the findings.    Amplification needs:  Patient could benefit from amplification if they feel increased communication difficulties.    RECOMMENDATIONS/PLAN:  Follow-up recommendations of Dr. Onofre.  Tinnitus management strategies. Consider use of amplification, a white noise machine, low level TV/radio, or fan at night to help mask the tinnitus. It is also recommended to avoid caffeine, alcohol, tobacco, salt, high dose NSAIDs, and to increase hydration. Additional resources: Resound Relief jean claude and American Tinnitus Association (www.nelli.org).  Discussed results and recommendations with patient. Questions were addressed and the patient was encouraged to contact our department should concerns  arise.          Angelo Herrera M.S, [facility]  Licensed Audiologist

## 2023-09-13 ENCOUNTER — PATIENT ROUNDING (BHMG ONLY) (OUTPATIENT)
Dept: OTOLARYNGOLOGY | Facility: CLINIC | Age: 73
End: 2023-09-13
Payer: MEDICARE

## 2023-09-13 NOTE — PROGRESS NOTES
A My-Chart message has been sent to the patient for [place] [facility]

## 2023-09-13 NOTE — PROGRESS NOTES
A Reesio message has been send to the patient for PATIENT ROUNDING with [facility].

## 2023-09-15 ENCOUNTER — TELEPHONE (OUTPATIENT)
Dept: GASTROENTEROLOGY | Facility: CLINIC | Age: 73
End: 2023-09-15
Payer: MEDICARE

## 2023-09-15 NOTE — TELEPHONE ENCOUNTER
Chinmay Roque  1950    Patient requested to Reschedule their Colonoscopy. I have offered to reschedule this patient and patient has agreed. Patient has been rescheduled to 11/28/23 with an estimated arrival time of 6:30.    Reason for cancelling/rescheduling: Patient request    This procedure was ordered by MALGORZATA Sprague for an important reason. We want to inform you that there are risks associated with not proceeding with the procedure at this time such as a delay in diagnosis, risk of incurable disease, or cancer.    Patient plans to call us back to reschedule:     Updated clearance needed?: No    If yes, clearance request has been submitted to: N/A    Patient verbalized understanding for all of the above information.

## 2023-09-15 NOTE — TELEPHONE ENCOUNTER
Caller: Chinmay Roque    Relationship to patient: Self    Best call back number: [phone]     Type of visit: COLONOSCOPY     Requested date: NO SPECIFIC DATE GIVEN     If rescheduling, when is the original appointment: 11/14/2023    Additional notes: PATIENT CURRENTLY HAS A PROCEDURE SCHEDULED WITH DR DDOGE ON 11/14/2023 THEY NEED RESCHEDULED.  PLEASE CALL PATIENT.

## 2023-10-03 ENCOUNTER — HOSPITAL ENCOUNTER (OUTPATIENT)
Dept: CT IMAGING | Facility: HOSPITAL | Age: 73
Discharge: HOME | End: 2023-10-03
Payer: MEDICARE

## 2023-10-03 DIAGNOSIS — F01.A0 MILD VASCULAR DEMENTIA WITHOUT BEHAVIORAL DISTURBANCE, PSYCHOTIC DISTURBANCE, MOOD DISTURBANCE, OR ANXIETY: ICD-10-CM

## 2023-10-03 DIAGNOSIS — R91.1 INCIDENTAL LUNG NODULE, > 3MM AND < 8MM: Primary | ICD-10-CM

## 2023-10-03 LAB
CREAT BLDA-MCNC: 1.2 MG/DL
EGFRCR SERPLBLD CKD-EPI 2021: 63.9 ML/MIN/1.73

## 2023-10-03 PROCEDURE — 70498 CT ANGIOGRAPHY NECK: CPT

## 2023-10-03 PROCEDURE — 82565 ASSAY OF CREATININE: CPT

## 2023-10-03 PROCEDURE — 25510000001 IOPAMIDOL PER 1 ML

## 2023-10-03 RX ADMIN — IOPAMIDOL 100 ML: 755 INJECTION, SOLUTION INTRAVENOUS at 09:20

## 2023-10-30 ENCOUNTER — HOSPITAL ENCOUNTER (OUTPATIENT)
Dept: CT IMAGING | Facility: HOSPITAL | Age: 73
Discharge: HOME | End: 2023-10-30
Payer: MEDICARE

## 2023-10-30 DIAGNOSIS — R91.1 INCIDENTAL LUNG NODULE, > 3MM AND < 8MM: ICD-10-CM

## 2023-10-30 PROCEDURE — 71250 CT THORAX DX C-: CPT

## 2023-11-02 DIAGNOSIS — R91.1 LUNG NODULE: Primary | ICD-10-CM

## 2023-11-20 NOTE — PRE-PROCEDURE INSTRUCTIONS
"Instructed on date and arrival time of 0630. Come to entrance \"C\". Must have  over age 18 to drive home.  May have two visitors; however, children under 12 must stay in waiting room.  Discussed clear liquid diet (no red or purple), bowel prep, and NPO.  May take medications as usual except for blood thinners, diabetic medications, and weight loss medications.  Bring list of medications.  Verbalized understanding of instructions given.  Instructed to call for questions or concerns.  Spoke with wife.  "

## 2023-11-27 ENCOUNTER — ANESTHESIA EVENT (OUTPATIENT)
Dept: GASTROENTEROLOGY | Facility: HOSPITAL | Age: 73
End: 2023-11-27
Payer: MEDICARE

## 2023-11-27 NOTE — ANESTHESIA PREPROCEDURE EVALUATION
Anesthesia Evaluation     Patient summary reviewed and Nursing notes reviewed   NPO Solid Status: > 8 hours  NPO Liquid Status: > 4 hours           Airway   Mallampati: III  TM distance: >3 FB  Neck ROM: full  No difficulty expected  Dental - normal exam     Pulmonary - normal exam    breath sounds clear to auscultation  Cardiovascular - normal exam  Exercise tolerance: good (4-7 METS)    Rhythm: regular  Rate: normal      ROS comment: anemia    Neuro/Psych    ROS Comment: Hearing Loss  GI/Hepatic/Renal/Endo      Musculoskeletal     Abdominal    Substance History      OB/GYN          Other                      Anesthesia Plan    ASA 1     general   total IV anesthesia  intravenous induction     Anesthetic plan, risks, benefits, and alternatives have been provided, discussed and informed consent has been obtained with: patient and spouse/significant other.  Pre-procedure education provided  Plan discussed with CRNA.      CODE STATUS:

## 2023-11-28 ENCOUNTER — ANESTHESIA (OUTPATIENT)
Dept: GASTROENTEROLOGY | Facility: HOSPITAL | Age: 73
End: 2023-11-28
Payer: MEDICARE

## 2023-11-28 ENCOUNTER — HOSPITAL ENCOUNTER (OUTPATIENT)
Facility: HOSPITAL | Age: 73
Setting detail: HOSPITAL OUTPATIENT SURGERY
Discharge: HOME | End: 2023-11-28
Attending: INTERNAL MEDICINE | Admitting: INTERNAL MEDICINE
Payer: MEDICARE

## 2023-11-28 VITALS
BODY MASS INDEX: 24.74 KG/M2 | SYSTOLIC BLOOD PRESSURE: 135 MMHG | HEART RATE: 57 BPM | TEMPERATURE: 98.2 F | RESPIRATION RATE: 19 BRPM | WEIGHT: 203.26 LBS | OXYGEN SATURATION: 99 % | DIASTOLIC BLOOD PRESSURE: 80 MMHG

## 2023-11-28 DIAGNOSIS — Z86.010 PERSONAL HISTORY OF COLONIC POLYPS: ICD-10-CM

## 2023-11-28 DIAGNOSIS — Z12.11 ENCOUNTER FOR SCREENING FOR MALIGNANT NEOPLASM OF COLON: ICD-10-CM

## 2023-11-28 PROCEDURE — 25010000002 PROPOFOL 10 MG/ML EMULSION: Performed by: NURSE ANESTHETIST, CERTIFIED REGISTERED

## 2023-11-28 PROCEDURE — 88305 TISSUE EXAM BY PATHOLOGIST: CPT | Performed by: INTERNAL MEDICINE

## 2023-11-28 PROCEDURE — 25810000003 LACTATED RINGERS PER 1000 ML

## 2023-11-28 RX ORDER — LIDOCAINE HYDROCHLORIDE 20 MG/ML
INJECTION, SOLUTION EPIDURAL; INFILTRATION; INTRACAUDAL; PERINEURAL AS NEEDED
Status: DISCONTINUED | OUTPATIENT
Start: 2023-11-28 | End: 2023-11-28 | Stop reason: SURG

## 2023-11-28 RX ORDER — SODIUM CHLORIDE, SODIUM LACTATE, POTASSIUM CHLORIDE, CALCIUM CHLORIDE 600; 310; 30; 20 MG/100ML; MG/100ML; MG/100ML; MG/100ML
30 INJECTION, SOLUTION INTRAVENOUS CONTINUOUS
Status: DISCONTINUED | OUTPATIENT
Start: 2023-11-28 | End: 2023-11-28 | Stop reason: HOSPADM

## 2023-11-28 RX ORDER — PROPOFOL 10 MG/ML
VIAL (ML) INTRAVENOUS AS NEEDED
Status: DISCONTINUED | OUTPATIENT
Start: 2023-11-28 | End: 2023-11-28 | Stop reason: SURG

## 2023-11-28 RX ADMIN — PROPOFOL 60 MG: 10 INJECTION, EMULSION INTRAVENOUS at 08:12

## 2023-11-28 RX ADMIN — LIDOCAINE HYDROCHLORIDE 50 MG: 20 INJECTION, SOLUTION EPIDURAL; INFILTRATION; INTRACAUDAL; PERINEURAL at 08:12

## 2023-11-28 RX ADMIN — PROPOFOL 200 MCG/KG/MIN: 10 INJECTION, EMULSION INTRAVENOUS at 08:12

## 2023-11-28 RX ADMIN — SODIUM CHLORIDE, POTASSIUM CHLORIDE, SODIUM LACTATE AND CALCIUM CHLORIDE 30 ML/HR: 600; 310; 30; 20 INJECTION, SOLUTION INTRAVENOUS at 07:01

## 2023-11-28 NOTE — ANESTHESIA POSTPROCEDURE EVALUATION
Patient: Chinmay Roque    Procedure Summary       Date: 11/28/23 Room / Location: [facility] ENDOSCOPY 2 / [facility]    Anesthesia Start: 0809 Anesthesia Stop: 0837    Procedure: COLONOSCOPY WITH POLYPECTOMY Diagnosis:       Encounter for screening for malignant neoplasm of colon      Personal history of colonic polyps      (Encounter for screening for malignant neoplasm of colon [Z12.11])      (Personal history of colonic polyps [Z86.010])    Surgeons: Huy Rodriguez MD Provider: Rene Araya CRNA    Anesthesia Type: general ASA Status: 1            Anesthesia Type: general    Vitals  Vitals Value Taken Time   /80 11/28/23 0852   Temp 36.8 °C (98.2 °F) 11/28/23 0837   Pulse 58 11/28/23 0855   Resp 19 11/28/23 0852   SpO2 99 % 11/28/23 0855   Vitals shown include unfiled device data.        Post Anesthesia Care and Evaluation    Post-procedure mental status: acceptable.  Pain management: satisfactory to patient    Airway patency: patent  Anesthetic complications: No anesthetic complications    Cardiovascular status: acceptable  Respiratory status: acceptable    Comments: Per chart review

## 2023-11-28 NOTE — ANESTHESIA POSTPROCEDURE EVALUATION
Patient: Chinmay Roque    Procedure Summary       Date: 11/28/23 Room / Location: [facility] ENDOSCOPY 2 / [facility] ENDOSCOPY    Anesthesia Start: 0809 Anesthesia Stop: 0837    Procedure: COLONOSCOPY WITH POLYPECTOMY Diagnosis:       Encounter for screening for malignant neoplasm of colon      Personal history of colonic polyps      (Encounter for screening for malignant neoplasm of colon [Z12.11])      (Personal history of colonic polyps [Z86.010])    Surgeons: Huy Rodriguez MD Provider: Rene Araya CRNA    Anesthesia Type: general ASA Status: 1            Anesthesia Type: general    Vitals  Vitals Value Taken Time   /78 11/28/23 0847   Temp 36.8 °C (98.2 °F) 11/28/23 0837   Pulse 63 11/28/23 0846   Resp 18 11/28/23 0842   SpO2 99 % 11/28/23 0846   Vitals shown include unfiled device data.        Post Anesthesia Care and Evaluation    Patient location during evaluation: PACU  Patient participation: complete - patient participated  Level of consciousness: awake  Pain scale: See nurse's notes for pain score.  Pain management: adequate    Airway patency: patent  Anesthetic complications: No anesthetic complications  PONV Status: none  Cardiovascular status: acceptable  Respiratory status: acceptable and spontaneous ventilation  Hydration status: acceptable    Comments: Patient seen and examined postoperatively; vital signs stable; SpO2 greater than or equal to 90%; cardiopulmonary status stable; nausea/vomiting adequately controlled; pain adequately controlled; no apparent anesthesia complications; patient discharged from anesthesia care when discharge criteria were met
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
## 2023-11-29 LAB
CYTO UR: NORMAL
LAB AP CASE REPORT: NORMAL
LAB AP CLINICAL INFORMATION: NORMAL
PATH REPORT.FINAL DX SPEC: NORMAL
PATH REPORT.GROSS SPEC: NORMAL

## 2024-01-02 ENCOUNTER — HOSPITAL ENCOUNTER (OUTPATIENT)
Dept: CT IMAGING | Facility: HOSPITAL | Age: 74
Discharge: HOME | End: 2024-01-02
Admitting: INTERNAL MEDICINE
Payer: MEDICARE

## 2024-01-02 DIAGNOSIS — R91.1 LUNG NODULE: ICD-10-CM

## 2024-01-02 PROCEDURE — 71250 CT THORAX DX C-: CPT

## 2024-01-03 ENCOUNTER — TELEPHONE (OUTPATIENT)
Dept: INTERNAL MEDICINE | Facility: CLINIC | Age: 74
End: 2024-01-03
Payer: MEDICARE

## 2024-01-03 DIAGNOSIS — R91.1 LUNG NODULE SEEN ON IMAGING STUDY: Primary | ICD-10-CM

## 2024-01-09 ENCOUNTER — OFFICE VISIT (OUTPATIENT)
Dept: INTERNAL MEDICINE | Facility: CLINIC | Age: 74
End: 2024-01-09
Payer: MEDICARE

## 2024-01-09 VITALS
HEART RATE: 62 BPM | WEIGHT: 208.8 LBS | HEIGHT: 76 IN | TEMPERATURE: 97.3 F | SYSTOLIC BLOOD PRESSURE: 124 MMHG | DIASTOLIC BLOOD PRESSURE: 74 MMHG | BODY MASS INDEX: 25.43 KG/M2 | OXYGEN SATURATION: 98 %

## 2024-01-09 DIAGNOSIS — D50.9 IRON DEFICIENCY ANEMIA, UNSPECIFIED IRON DEFICIENCY ANEMIA TYPE: ICD-10-CM

## 2024-01-09 DIAGNOSIS — J43.9 PULMONARY EMPHYSEMA, UNSPECIFIED EMPHYSEMA TYPE: ICD-10-CM

## 2024-01-09 DIAGNOSIS — H93.13 TINNITUS OF BOTH EARS: ICD-10-CM

## 2024-01-09 DIAGNOSIS — R91.1 LUNG NODULE SEEN ON IMAGING STUDY: Primary | ICD-10-CM

## 2024-01-09 DIAGNOSIS — R41.3 MEMORY DEFICIT: ICD-10-CM

## 2024-01-09 DIAGNOSIS — R79.9 ABNORMAL FINDING OF BLOOD CHEMISTRY, UNSPECIFIED: ICD-10-CM

## 2024-01-09 LAB
ALBUMIN SERPL-MCNC: 3.9 G/DL (ref 3.5–5.2)
ALBUMIN/GLOB SERPL: 1.4 G/DL
ALP SERPL-CCNC: 96 U/L (ref 39–117)
ALPHA1 GLOB MFR UR ELPH: 123 MG/DL (ref 90–200)
ALT SERPL W P-5'-P-CCNC: 10 U/L (ref 1–41)
ANION GAP SERPL CALCULATED.3IONS-SCNC: 10.1 MMOL/L (ref 5–15)
AST SERPL-CCNC: 19 U/L (ref 1–40)
BASOPHILS # BLD AUTO: 0.04 10*3/MM3 (ref 0–0.2)
BASOPHILS NFR BLD AUTO: 0.9 % (ref 0–1.5)
BILIRUB SERPL-MCNC: 0.4 MG/DL (ref 0–1.2)
BUN SERPL-MCNC: 22 MG/DL (ref 8–23)
BUN/CREAT SERPL: 19.8 (ref 7–25)
CALCIUM SPEC-SCNC: 9.1 MG/DL (ref 8.6–10.5)
CHLORIDE SERPL-SCNC: 104 MMOL/L (ref 98–107)
CO2 SERPL-SCNC: 25.9 MMOL/L (ref 22–29)
CREAT SERPL-MCNC: 1.11 MG/DL (ref 0.76–1.27)
DEPRECATED RDW RBC AUTO: 40.5 FL (ref 37–54)
EGFRCR SERPLBLD CKD-EPI 2021: 70.1 ML/MIN/1.73
EOSINOPHIL # BLD AUTO: 0.31 10*3/MM3 (ref 0–0.4)
EOSINOPHIL NFR BLD AUTO: 6.8 % (ref 0.3–6.2)
ERYTHROCYTE [DISTWIDTH] IN BLOOD BY AUTOMATED COUNT: 11.9 % (ref 12.3–15.4)
GLOBULIN UR ELPH-MCNC: 2.8 GM/DL
GLUCOSE SERPL-MCNC: 82 MG/DL (ref 65–99)
HBA1C MFR BLD: 5.4 % (ref 4.8–5.6)
HCT VFR BLD AUTO: 39.1 % (ref 37.5–51)
HGB BLD-MCNC: 13.6 G/DL (ref 13–17.7)
IMM GRANULOCYTES # BLD AUTO: 0.05 10*3/MM3 (ref 0–0.05)
IMM GRANULOCYTES NFR BLD AUTO: 1.1 % (ref 0–0.5)
LYMPHOCYTES # BLD AUTO: 1.28 10*3/MM3 (ref 0.7–3.1)
LYMPHOCYTES NFR BLD AUTO: 28 % (ref 19.6–45.3)
MCH RBC QN AUTO: 32.2 PG (ref 26.6–33)
MCHC RBC AUTO-ENTMCNC: 34.8 G/DL (ref 31.5–35.7)
MCV RBC AUTO: 92.4 FL (ref 79–97)
MONOCYTES # BLD AUTO: 0.49 10*3/MM3 (ref 0.1–0.9)
MONOCYTES NFR BLD AUTO: 10.7 % (ref 5–12)
NEUTROPHILS NFR BLD AUTO: 2.4 10*3/MM3 (ref 1.7–7)
NEUTROPHILS NFR BLD AUTO: 52.5 % (ref 42.7–76)
NRBC BLD AUTO-RTO: 0 /100 WBC (ref 0–0.2)
PLATELET # BLD AUTO: 244 10*3/MM3 (ref 140–450)
PMV BLD AUTO: 10 FL (ref 6–12)
POTASSIUM SERPL-SCNC: 4.4 MMOL/L (ref 3.5–5.2)
PROT SERPL-MCNC: 6.7 G/DL (ref 6–8.5)
RBC # BLD AUTO: 4.23 10*6/MM3 (ref 4.14–5.8)
SODIUM SERPL-SCNC: 140 MMOL/L (ref 136–145)
TSH SERPL DL<=0.05 MIU/L-ACNC: 3.31 UIU/ML (ref 0.27–4.2)
WBC NRBC COR # BLD AUTO: 4.57 10*3/MM3 (ref 3.4–10.8)

## 2024-01-09 PROCEDURE — 83036 HEMOGLOBIN GLYCOSYLATED A1C: CPT | Performed by: INTERNAL MEDICINE

## 2024-01-09 PROCEDURE — 80053 COMPREHEN METABOLIC PANEL: CPT | Performed by: INTERNAL MEDICINE

## 2024-01-09 PROCEDURE — 84443 ASSAY THYROID STIM HORMONE: CPT | Performed by: INTERNAL MEDICINE

## 2024-01-09 PROCEDURE — 82103 ALPHA-1-ANTITRYPSIN TOTAL: CPT | Performed by: INTERNAL MEDICINE

## 2024-01-09 PROCEDURE — 85025 COMPLETE CBC W/AUTO DIFF WBC: CPT | Performed by: INTERNAL MEDICINE

## 2024-01-09 RX ORDER — BETAMETHASONE DIPROPIONATE 0.5 MG/G
LOTION TOPICAL DAILY
Qty: 180 ML | Refills: 1 | Status: SHIPPED | OUTPATIENT
Start: 2024-01-09

## 2024-01-09 RX ORDER — NYSTATIN AND TRIAMCINOLONE ACETONIDE 100000; 1 [USP'U]/G; MG/G
1 OINTMENT TOPICAL 2 TIMES DAILY
Qty: 180 G | Refills: 4 | Status: SHIPPED | OUTPATIENT
Start: 2024-01-09 | End: 2024-01-09 | Stop reason: SDUPTHER

## 2024-01-09 RX ORDER — DONEPEZIL HYDROCHLORIDE 10 MG/1
10 TABLET, FILM COATED ORAL DAILY
Qty: 90 TABLET | Refills: 1 | Status: SHIPPED | OUTPATIENT
Start: 2024-01-09

## 2024-01-09 RX ORDER — ASPIRIN 81 MG/1
81 TABLET, CHEWABLE ORAL DAILY
Qty: 90 TABLET | Refills: 1 | Status: SHIPPED | OUTPATIENT
Start: 2024-01-09

## 2024-01-09 RX ORDER — ETODOLAC 400 MG/1
400 TABLET, EXTENDED RELEASE ORAL 2 TIMES DAILY WITH MEALS
Qty: 180 TABLET | Refills: 1 | Status: SHIPPED | OUTPATIENT
Start: 2024-01-09

## 2024-01-09 RX ORDER — QUETIAPINE FUMARATE 400 MG/1
400-800 TABLET, FILM COATED ORAL NIGHTLY
Qty: 180 TABLET | Refills: 2 | Status: SHIPPED | OUTPATIENT
Start: 2024-01-09

## 2024-01-09 RX ORDER — NYSTATIN AND TRIAMCINOLONE ACETONIDE 100000; 1 [USP'U]/G; MG/G
1 OINTMENT TOPICAL 2 TIMES DAILY
Qty: 180 G | Refills: 4 | Status: SHIPPED | OUTPATIENT
Start: 2024-01-09

## 2024-01-09 RX ORDER — BETAMETHASONE DIPROPIONATE 0.5 MG/G
LOTION TOPICAL DAILY
Qty: 180 ML | Refills: 1 | Status: SHIPPED | OUTPATIENT
Start: 2024-01-09 | End: 2024-01-09 | Stop reason: SDUPTHER

## 2024-01-09 RX ORDER — AMOXICILLIN AND CLAVULANATE POTASSIUM 875; 125 MG/1; MG/1
1 TABLET, FILM COATED ORAL 2 TIMES DAILY PRN
COMMUNITY
Start: 2023-12-27

## 2024-01-09 NOTE — PROGRESS NOTES
"Chief Complaint  Tinnitus of both ears, Med Refill, and Imaging Results (Over CT results from 1/2/2024)    Subjective      History of Present Illness  Chinmay Roque is a 73 y.o. male who presents to [facility] with a past medical history of  Past Medical History:   Diagnosis Date    Anemia     HL (hearing loss)     Insomnia     Tinnitus Feb 2005     Overall doing about the same  Still having ringing in his ears  Did see audiology and is awaiting his hearing aids    Tolerating Aricept well  Does unfortunately feel like his memory may be getting worse    Uses betamethasone for his scalp and this works well for him    Reviewed CT scan with him which showed unchanged appearance of lung nodules however did comment on emphysema  States he does not have trouble breathing    Objective   Vital Signs:   Vitals:    01/09/24 0747   BP: 124/74   Pulse: 62   Temp: 97.3 °F (36.3 °C)   TempSrc: Temporal   SpO2: 98%   Weight: 94.7 kg (208 lb 12.8 oz)   Height: 193 cm (76\")   PainSc: 0-No pain     Body mass index is 25.42 kg/m².    Wt Readings from Last 3 Encounters:   01/09/24 94.7 kg (208 lb 12.8 oz)   11/28/23 92.2 kg (203 lb 4.2 oz)   09/11/23 93.6 kg (206 lb 6.4 oz)     BP Readings from Last 3 Encounters:   01/09/24 124/74   11/28/23 135/80   09/11/23 111/63       Health Maintenance   Topic Date Due    COVID-19 Vaccine (6 - 2023-24 season) 04/09/2024 (Originally 9/1/2023)    ANNUAL WELLNESS VISIT  05/22/2024    BMI FOLLOWUP  01/09/2025    COLORECTAL CANCER SCREENING  11/28/2028    TDAP/TD VACCINES (3 - Td or Tdap) 04/11/2030    HEPATITIS C SCREENING  Completed    INFLUENZA VACCINE  Completed    Pneumococcal Vaccine 65+  Completed    ZOSTER VACCINE  Completed       Physical Exam  Vitals reviewed.   Constitutional:       Appearance: Normal appearance. He is well-developed.   HENT:      Head: Normocephalic and atraumatic.      Right Ear: External ear normal.      Left Ear: External " ear normal.   Eyes:      Conjunctiva/sclera: Conjunctivae normal.      Pupils: Pupils are equal, round, and reactive to light.   Cardiovascular:      Rate and Rhythm: Normal rate and regular rhythm.      Heart sounds: No murmur heard.     No friction rub. No gallop.   Pulmonary:      Effort: Pulmonary effort is normal.      Breath sounds: Normal breath sounds. No wheezing or rhonchi.   Skin:     General: Skin is warm and dry.   Neurological:      Mental Status: He is alert and oriented to person, place, and time.   Psychiatric:         Mood and Affect: Affect normal.         Behavior: Behavior normal.         Thought Content: Thought content normal.            Result Review :  The following data was reviewed by: Taylor Blake MD on 01/09/2024:      Procedures        Assessment and Plan   Diagnoses and all orders for this visit:    1. Lung nodule seen on imaging study (Primary)  Comments:  Will get him in with the lung nodule clinic  Orders:  -     Ambulatory Referral to Lung Nodule Clinic - [facility]    2. Memory deficit  Comments:  Continue Aricept  Orders:  -     donepezil (ARICEPT) 10 MG tablet; Take 1 tablet by mouth Daily.  Dispense: 90 tablet; Refill: 1  -     Comprehensive Metabolic Panel  -     CBC & Differential  -     TSH  -     Hemoglobin A1c; Future  -     Hemoglobin A1c    3. Tinnitus of both ears  Comments:  Await hearing aids    4. Pulmonary emphysema, unspecified emphysema type  Comments:  Non-smoker does have a history of exposures to wood-burning stove will check alpha-1 antitrypsin  Orders:  -     Alpha - 1 - Antitrypsin; Future  -     Comprehensive Metabolic Panel  -     CBC & Differential  -     TSH  -     Hemoglobin A1c; Future  -     Alpha - 1 - Antitrypsin  -     Hemoglobin A1c    5. Iron deficiency anemia, unspecified iron deficiency anemia type  Comments:  Check labs and adjust as needed  Orders:  -     Comprehensive Metabolic Panel  -     CBC & Differential  -     TSH  -     Hemoglobin  A1c; Future  -     Hemoglobin A1c    6. Abnormal finding of blood chemistry, unspecified  -     Hemoglobin A1c; Future  -     Hemoglobin A1c    Other orders  -     aspirin 81 MG chewable tablet; Chew 1 tablet Daily.  Dispense: 90 tablet; Refill: 1  -     Discontinue: betamethasone dipropionate 0.05 % lotion; Apply  topically to the appropriate area as directed Daily.  Dispense: 180 mL; Refill: 1  -     etodolac XL (LODINE XL) 400 MG 24 hr tablet; Take 1 tablet by mouth 2 (Two) Times a Day With Meals.  Dispense: 180 tablet; Refill: 1  -     Discontinue: nystatin-triamcinolone (MYCOLOG) 143268-6.1 UNIT/GM-% ointment; Apply 1 Application topically to the appropriate area as directed 2 (Two) Times a Day.  Dispense: 180 g; Refill: 4  -     QUEtiapine (SEROquel) 400 MG tablet; Take 1-2 tablets by mouth Every Night.  Dispense: 180 tablet; Refill: 2  -     nystatin-triamcinolone (MYCOLOG) 562319-5.1 UNIT/GM-% ointment; Apply 1 Application topically to the appropriate area as directed 2 (Two) Times a Day.  Dispense: 180 g; Refill: 4  -     betamethasone dipropionate 0.05 % lotion; Apply  topically to the appropriate area as directed Daily.  Dispense: 180 mL; Refill: 1                  FOLLOW UP  Return in about 4 months (around 5/9/2024).  Patient was given instructions and counseling regarding his condition or for health maintenance advice. Please see specific information pulled into the AVS if appropriate.       Taylor Blake MD  01/10/24  08:12 EST    CURRENT & DISCONTINUED MEDICATIONS  Current Outpatient Medications   Medication Instructions    amoxicillin-clavulanate (AUGMENTIN) 875-125 MG per tablet 1 tablet, Oral, 2 Times Daily PRN    aspirin 81 mg, Oral, Daily    B Complex Vitamins (VITAMIN B COMPLEX 100 IJ) vitamin B complex oral capsule take 1 capsule by oral route daily   Active    betamethasone dipropionate 0.05 % lotion Topical, Daily    clonazePAM (KLONOPIN) 1 mg, Nightly    donepezil (ARICEPT) 10 mg,  Oral, Daily    etodolac XL (LODINE XL) 400 mg, Oral, 2 Times Daily With Meals    nystatin-triamcinolone (MYCOLOG) 042183-4.1 UNIT/GM-% ointment 1 Application, Topical, 2 Times Daily    polysacchar iron-FA-B12 (Ferrex 150 Forte) 150-1-25 MG-MG-MCG capsule capsule 1 capsule, Oral, 2 Times Daily    QUEtiapine (SEROQUEL) 400-800 mg, Oral, Nightly    sildenafil (REVATIO) 20 mg, Oral, Daily PRN, Take 2-4 tablets 1 hour prior to sexual activity as needed       Medications Discontinued During This Encounter   Medication Reason    QUEtiapine (SEROquel) 400 MG tablet Reorder    aspirin 81 MG chewable tablet Reorder    donepezil (ARICEPT) 10 MG tablet Reorder    etodolac XL (LODINE XL) 400 MG 24 hr tablet Reorder    betamethasone dipropionate 0.05 % lotion Reorder    nystatin-triamcinolone (MYCOLOG) 435071-4.1 UNIT/GM-% ointment Reorder    betamethasone dipropionate 0.05 % lotion Reorder    nystatin-triamcinolone (MYCOLOG) 203043-3.1 UNIT/GM-% ointment Reorder

## 2024-01-17 ENCOUNTER — OFFICE VISIT (OUTPATIENT)
Dept: PULMONOLOGY | Facility: CLINIC | Age: 74
End: 2024-01-17
Payer: MEDICARE

## 2024-01-17 VITALS
WEIGHT: 214.2 LBS | HEIGHT: 76 IN | DIASTOLIC BLOOD PRESSURE: 82 MMHG | OXYGEN SATURATION: 98 % | HEART RATE: 69 BPM | BODY MASS INDEX: 26.08 KG/M2 | SYSTOLIC BLOOD PRESSURE: 163 MMHG | RESPIRATION RATE: 16 BRPM

## 2024-01-17 DIAGNOSIS — R91.1 SOLITARY LUNG NODULE: Primary | ICD-10-CM

## 2024-01-17 DIAGNOSIS — Z77.120 MOLD EXPOSURE: ICD-10-CM

## 2024-01-17 DIAGNOSIS — Z78.9 NEVER SMOKED CIGARETTES: ICD-10-CM

## 2024-01-17 DIAGNOSIS — Z80.1 FAMILY HISTORY OF LUNG CANCER: ICD-10-CM

## 2024-01-17 NOTE — PROGRESS NOTES
Primary Care Provider  Taylor Blake MD   Referring Provider  Taylor Blake MD      Patient Complaint  Establish Care and Lung Nodule (7 mm spiculated nodule )      Subjective          Chinmay Roque presents to [facility]      History of Presenting Illness  Chinmay Roque is a 73 y.o. male here for evaluation for solitary lung nodule.  The patient has a history of tinnitus and was undergoing evaluation.  Had a CT of his neck which caught a pulmonary nodule.  Patient had a dedicated chest CT which showed a 7 x 9 mm right upper lobe pulmonary nodule that appeared slightly spiculated.  Also had multiple other lung nodules that were less than 3 mm and were solid versus groundglass in nature scattered throughout the right lung.  Had a 3-month follow-up noncontrast chest CT done showing no change in the size of these pulmonary nodules, in particular the right upper lobe index nodule.  From a pulmonary perspective, he is asymptomatic and denies any complaints. Denies dyspnea, coughing, wheezing, headaches, chest pain, weight loss or hemoptysis. Denies fevers, chills and night sweats.  He is able to perform ADLs without difficulties and denies any swollen glands/lymph nodes in the head or neck.    Patient and his family are very concerned about ruling out lung cancer as they have a family history of lung cancer and a recent family member die from it within the past year.  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    I have personally reviewed the review of systems, past family, social, medical and surgical histories; and agree with their findings.        Review of Systems  Constitutional symptoms:  Denied complaints   Ear, nose, throat: Tinnitus, otherwise denied complaints  Cardiovascular:  Denied complaints  Respiratory: Denied complaints  Gastrointestinal: Denied  complaints  Musculoskeletal: Denied complaints  Genitourinary: Denied complaints  Allergy / Immunology: Denied complaints  Hematologic: Denied complaints  Neurologic: Denied complaints  Skin: Denied complaints  Endocrine: Denied complaints  Psychiatric: Denied complaints      Family History   Problem Relation Age of Onset    Prostate cancer Brother     Hyperlipidemia Mother     Cancer Father     Colon cancer Neg Hx         Social History     Socioeconomic History    Marital status:    Tobacco Use    Smoking status: Never     Passive exposure: Never    Smokeless tobacco: Never   Vaping Use    Vaping Use: Never used   Substance and Sexual Activity    Alcohol use: Yes     Alcohol/week: 2.0 standard drinks of alcohol     Types: 2 Glasses of wine per week     Comment: occasional    Drug use: Never    Sexual activity: Yes     Partners: Female        Past Medical History:   Diagnosis Date    Anemia     HL (hearing loss)     Insomnia     Tinnitus Feb 2005        Immunization History   Administered Date(s) Administered    COVID-19 (MODERNA) 1st,2nd,3rd Dose Monovalent 01/18/2021, 02/12/2021, 09/10/2021, 04/21/2022    COVID-19 (PFIZER) BIVALENT 12+YRS 09/28/2022    Fluad Quad 65+ 10/06/2021    Fluzone High-Dose 65+yrs 10/06/2022, 10/04/2023    Hepatitis A 05/01/2018, 11/01/2018    Pneumococcal Conjugate 13-Valent (PCV13) 08/01/2015    Pneumococcal Polysaccharide (PPSV23) 04/01/2016    Shingrix 06/01/2019, 08/01/2019    Tdap 05/01/2017, 04/11/2020         No Known Allergies       Current Outpatient Medications:     aspirin 81 MG chewable tablet, Chew 1 tablet Daily., Disp: 90 tablet, Rfl: 1    B Complex Vitamins (VITAMIN B COMPLEX 100 IJ), vitamin B complex oral capsule take 1 capsule by oral route daily   Active, Disp: , Rfl:     betamethasone dipropionate 0.05 % lotion, Apply  topically to the appropriate area as directed Daily., Disp: 180 mL, Rfl: 1    clonazePAM (KlonoPIN) 1 MG tablet, 1 tablet Every Night., Disp: ,  "Rfl:     donepezil (ARICEPT) 10 MG tablet, Take 1 tablet by mouth Daily., Disp: 90 tablet, Rfl: 1    etodolac XL (LODINE XL) 400 MG 24 hr tablet, Take 1 tablet by mouth 2 (Two) Times a Day With Meals., Disp: 180 tablet, Rfl: 1    nystatin-triamcinolone (MYCOLOG) 828649-1.1 UNIT/GM-% ointment, Apply 1 Application topically to the appropriate area as directed 2 (Two) Times a Day., Disp: 180 g, Rfl: 4    polysacchar iron-FA-B12 (Ferrex 150 Forte) 150-1-25 MG-MG-MCG capsule capsule, Take 1 capsule by mouth 2 (Two) Times a Day., Disp: 180 capsule, Rfl: 1    QUEtiapine (SEROquel) 400 MG tablet, Take 1-2 tablets by mouth Every Night., Disp: 180 tablet, Rfl: 2    sildenafil (REVATIO) 20 MG tablet, Take 1 tablet by mouth Daily As Needed (Sexual Activity). Take 2-4 tablets 1 hour prior to sexual activity as needed, Disp: 90 tablet, Rfl: 1    amoxicillin-clavulanate (AUGMENTIN) 875-125 MG per tablet, Take 1 tablet by mouth 2 (Two) Times a Day As Needed (Tinnitis in the ears). (Patient not taking: Reported on 1/17/2024), Disp: , Rfl:          Objective     Vital Signs:   /82 (BP Location: Left arm, Patient Position: Sitting, Cuff Size: Adult)   Pulse 69   Resp 16   Ht 193 cm (76\")   Wt 97.2 kg (214 lb 3.2 oz)   SpO2 98% Comment: Room air  BMI 26.07 kg/m²     Physical Exam  Vital Signs Reviewed   WDWN, Alert, NAD.    HEENT:  PERRL, EOMI.  OP, nares clear, no sinus tenderness  Neck:  Supple, no JVD, no thyromegaly  Lymph: no axillary, cervical, supraclavicular lymphadenopathy noted bilaterally  Chest:  good aeration, clear to auscultation bilaterally, tympanic to percussion bilaterally, no work of breathing noted  CV: RRR, no MGR, pulses 2+, equal.  Abd:  Soft, NT, ND, + BS, no HSM  EXT:  no clubbing, no cyanosis, no edema, no joint tenderness  Neuro:  A&Ox3, CN grossly intact, no focal deficits.  Skin: No rashes or lesions noted       Result Review :   I have personally reviewed the office notes from Dr. Blake.  I " personally reviewed a CT of the neck, chest CT in October 2023 and recent chest CT.  There is a stable right upper lobe lung nodule that is slightly spiculated.  It measures roughly 7 x 9 mm.  There are couple of other tiny nodules both solid and groundglass that are roughly 3 mm in size or less.  I do not see any suspicious mediastinal adenopathy or any other parenchymal pulmonary abnormalities.  CBC personally reviewed showing 310 peripheral eosinophils.  CMP with no evidence of chronic hypercapnia.             Assessment and Plan        * No active hospital problems. *      Impression:  Multiple lung nodules.  Index nodule being right upper lobe 7 x 9 mm  partially spiculated lung nodule.  Stable in size x 3 months  Never smoker  Inhalational exposure to mold, dust  Family history of lung cancer    Plan:  We will check nodifylung blood tests to help assess malignancy risk.  Pretest probability of this being a malignancy is currently in a moderate range at 24%.  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  I did offer him notify lung blood test as well as a short interval follow-up chest CT in 3 months.  If this was stable in size, we could do serial follow-up to ensure 2 years of stability and proceed with biopsy only if that nodule enlarges.  The patient and his family have a lot of anxiety about watchful waiting given family history of lung cancer and family members dying of lung cancer within the past year.  They would prefer to go ahead and proceed with tissue diagnosis via biopsy if at all possible.  Based off the size and location of the lung nodule, I quoted them a 70% chance of obtaining a tissue diagnosis via robotic navigational bronchoscopy.  After discussion with the patient and family we will proceed with robotic navigational bronchoscopy with possible endobronchial  ultrasound/fine-needle aspiration  I have discussed the risks of the procedure with the patient including pneumothorax, hemothorax, bleeding, hypoxia, required mechanical ventilation and death. The patient recognizes these findings, acknowledges these findings and is agreeable to the procedure.  Will check noncontrast chest CT via robotic navigational bronchoscopy format  At follow-up, if pathology comes back negative for malignancy, we will still continue to do close follow-up and would then plan for a repeat noncontrast chest CT in 3 months.  Smoking status: Never smoker  Vaccination status: Up-to-date with Prevnar, Pneumovax, flu vaccine  Medications personally reviewed.      Follow Up   Return in about 3 weeks (around 2/7/2024).  Patient was given instructions and counseling regarding his condition or for health maintenance advice. Please see specific information pulled into the AVS if appropriate.     Electronically signed by Jesse Monteiro MD, 01/17/24, 1:53 PM EST.

## 2024-01-18 DIAGNOSIS — R91.1 SOLITARY LUNG NODULE: Primary | ICD-10-CM

## 2024-01-18 PROBLEM — Z78.9 NEVER SMOKED CIGARETTES: Status: ACTIVE | Noted: 2024-01-17

## 2024-01-18 PROBLEM — Z80.1 FAMILY HISTORY OF LUNG CANCER: Status: ACTIVE | Noted: 2024-01-17

## 2024-01-22 ENCOUNTER — PATIENT ROUNDING (BHMG ONLY) (OUTPATIENT)
Dept: PULMONOLOGY | Facility: CLINIC | Age: 74
End: 2024-01-22
Payer: MEDICARE

## 2024-01-25 ENCOUNTER — HOSPITAL ENCOUNTER (OUTPATIENT)
Dept: CT IMAGING | Facility: HOSPITAL | Age: 74
Discharge: HOME | End: 2024-01-25
Admitting: INTERNAL MEDICINE
Payer: MEDICARE

## 2024-01-25 DIAGNOSIS — R91.1 SOLITARY LUNG NODULE: ICD-10-CM

## 2024-01-25 PROCEDURE — 71250 CT THORAX DX C-: CPT

## 2024-01-25 NOTE — PRE-PROCEDURE INSTRUCTIONS
IMPORTANT INSTRUCTIONS - PRE-ADMISSION TESTING  DO NOT EAT Or DRINK past midnight.      DO NOT BRING your medications to the hospital with you, UNLESS something has changed since your PRE-Admission Testing appointment.  Hold all vitamins, supplements, and NSAIDS (Non- steroidal anti-inflammatory meds) for one week prior to surgery (you MAY take Tylenol or Acetaminophen).  If you are diabetic, check your blood sugar the morning of your procedure. If it is less than 70 or if you are feeling symptomatic, call the following number for further instructions: [phone]  Use your inhalers/nebulizers as usual, the morning of your procedure. BRING YOUR INHALERS with you.   Bring your CPAP or BIPAP to hospital, ONLY IF YOU WILL BE SPENDING THE NIGHT.   Make sure you have a ride home and have someone who will stay with you the day of your procedure after you go home.  If you have any questions, please call your Pre-Admission Testing Nurse, Santana_ at [phone].   Per anesthesia request, do not smoke for 24 hours before your procedure or as instructed by your surgeon.    Clear Liquid Diet        Find out when you need to start a clear liquid diet.   Think of “clear liquids” as anything you could read a newspaper through. This includes things like water, broth, sports drinks, or tea WITHOUT any kind of milk or cream.           Once you are told to start a clear liquid diet, only drink these things until 2 hours before arrival to the hospital or when the hospital says to stop. Total volume limitation: 8 oz.       Clear liquids you CAN drink:   Water   Clear broth: beef, chicken, vegetable, or bone broth with nothing in it   Gatorade   Lemonade or Nate-aid   Soda   Tea, coffee (NO cream or honey)   Jell-O (without fruit)   Popsicles (without fruit or cream)   Italian ices   Juice without pulp: apple, white, grape   You may use salt, pepper, and sugar  No red liquids    Do NOT drink:   Milk or cream   Soy milk, almond  milk, coconut milk, or other non-dairy drinks and   creamers   Milkshakes or smoothies   Tomato juice   Orange juice   Grapefruit juice   Cream soups or any other than broth         Clear Liquid Diet:  Do NOT eat any solid food.  Do NOT eat or suck on mints or candy.  Do NOT chew gum.  Do NOT drink thick liquids like milk or juice with pulp in it.  Do NOT add milk, cream, or anything like soy milk or almond milk to coffee or tea.

## 2024-01-26 ENCOUNTER — TELEPHONE (OUTPATIENT)
Dept: PULMONOLOGY | Facility: CLINIC | Age: 74
End: 2024-01-26
Payer: MEDICARE

## 2024-01-26 RX ORDER — BETAMETHASONE DIPROPIONATE 0.5 MG/G
LOTION TOPICAL DAILY
Qty: 180 ML | Refills: 1 | Status: SHIPPED | OUTPATIENT
Start: 2024-01-26

## 2024-01-26 RX ORDER — NYSTATIN AND TRIAMCINOLONE ACETONIDE 100000; 1 [USP'U]/G; MG/G
1 OINTMENT TOPICAL 2 TIMES DAILY
Qty: 180 G | Refills: 4 | Status: SHIPPED | OUTPATIENT
Start: 2024-01-26

## 2024-01-26 NOTE — TELEPHONE ENCOUNTER
Spoke to patient's wife per release, informed her I do not see the results yet but once they come in, we would let them know. Patient's wife verbalized understanding.

## 2024-01-26 NOTE — TELEPHONE ENCOUNTER
Patients wife called and the patient has had some labs done and they are wondering if they have been released. They would like a call back. Thank You

## 2024-01-26 NOTE — TELEPHONE ENCOUNTER
Caller: PAUL GILLIS    Relationship: Emergency Contact    Best call back number: [phone]     Requested Prescriptions:   Requested Prescriptions     Pending Prescriptions Disp Refills    betamethasone dipropionate 0.05 % lotion 180 mL 1     Sig: Apply  topically to the appropriate area as directed Daily.    nystatin-triamcinolone (MYCOLOG) 398607-8.1 UNIT/GM-% ointment 180 g 4     Sig: Apply 1 Application topically to the appropriate area as directed 2 (Two) Times a Day.        Pharmacy where request should be sent: [facility] - [address] AT PORTAL TO [facility] - [phone]  - [phone]     Last office visit with prescribing clinician: 1/9/2024   Last telemedicine visit with prescribing clinician: Visit date not found   Next office visit with prescribing clinician: 5/20/2024     Additional details provided by patient: PATIENT'S SPOUSE IS REQUESTING A QUANTITY  FOR A 90 DAY SUPPLY FOR BOTH MEDICATIONS    Does the patient have less than a 3 day supply:  [] Yes  [x] No    Would you like a call back once the refill request has been completed: [] Yes [] No    If the office needs to give you a call back, can they leave a voicemail: [] Yes [] No    Oralia Bui Rep   01/26/24 10:26 EST

## 2024-01-29 ENCOUNTER — TELEPHONE (OUTPATIENT)
Dept: PULMONOLOGY | Facility: CLINIC | Age: 74
End: 2024-01-29
Payer: MEDICARE

## 2024-02-05 ENCOUNTER — TELEPHONE (OUTPATIENT)
Dept: PULMONOLOGY | Facility: CLINIC | Age: 74
End: 2024-02-05
Payer: MEDICARE

## 2024-02-19 ENCOUNTER — ANESTHESIA (OUTPATIENT)
Dept: PERIOP | Facility: HOSPITAL | Age: 74
End: 2024-02-19
Payer: MEDICARE

## 2024-02-19 ENCOUNTER — APPOINTMENT (OUTPATIENT)
Dept: GENERAL RADIOLOGY | Facility: HOSPITAL | Age: 74
End: 2024-02-19
Payer: MEDICARE

## 2024-02-19 ENCOUNTER — HOSPITAL ENCOUNTER (OUTPATIENT)
Facility: HOSPITAL | Age: 74
Setting detail: HOSPITAL OUTPATIENT SURGERY
Discharge: HOME | End: 2024-02-19
Attending: INTERNAL MEDICINE | Admitting: INTERNAL MEDICINE
Payer: MEDICARE

## 2024-02-19 ENCOUNTER — ANESTHESIA EVENT (OUTPATIENT)
Dept: PERIOP | Facility: HOSPITAL | Age: 74
End: 2024-02-19
Payer: MEDICARE

## 2024-02-19 VITALS
HEIGHT: 76 IN | DIASTOLIC BLOOD PRESSURE: 79 MMHG | WEIGHT: 209.66 LBS | TEMPERATURE: 97.1 F | BODY MASS INDEX: 25.53 KG/M2 | OXYGEN SATURATION: 97 % | SYSTOLIC BLOOD PRESSURE: 171 MMHG | RESPIRATION RATE: 18 BRPM | HEART RATE: 61 BPM

## 2024-02-19 DIAGNOSIS — Z78.9 NEVER SMOKED CIGARETTES: ICD-10-CM

## 2024-02-19 DIAGNOSIS — Z80.1 FAMILY HISTORY OF LUNG CANCER: ICD-10-CM

## 2024-02-19 DIAGNOSIS — R91.1 SOLITARY LUNG NODULE: ICD-10-CM

## 2024-02-19 LAB
ACB CMPLX DNA BAL NAA+NON-PRB-NCNCRNG: NOT DETECTED
BLACTX-M ISLT/SPM QL: NORMAL
BLAIMP ISLT/SPM QL: NORMAL
BLAKPC ISLT/SPM QL: NORMAL
BLAOXA-48-LIKE ISLT/SPM QL: NORMAL
BLAVIM ISLT/SPM QL: NORMAL
C PNEUM DNA NPH QL NAA+NON-PROBE: NOT DETECTED
CILIATED BAL QL: 42 %
E CLOAC COMP DNA BAL NAA+NON-PRB-NCNCRNG: NOT DETECTED
E COLI DNA BAL NAA+NON-PRB-NCNCRNG: NOT DETECTED
FLUAV SUBTYP SPEC NAA+PROBE: NOT DETECTED
FLUBV RNA ISLT QL NAA+PROBE: NOT DETECTED
GP B STREP DNA BAL NAA+NON-PRB-NCNCRNG: NOT DETECTED
GRAM STN SPEC: NORMAL
GRAM STN SPEC: NORMAL
HADV DNA SPEC NAA+PROBE: NOT DETECTED
HAEM INFLU DNA BAL NAA+NON-PRB-NCNCRNG: NOT DETECTED
HCOV RNA LOWER RESP QL NAA+NON-PROBE: NOT DETECTED
HMPV RNA NPH QL NAA+NON-PROBE: NOT DETECTED
HPIV RNA LOWER RESP QL NAA+NON-PROBE: NOT DETECTED
K AEROGENES DNA BAL NAA+NON-PRB-NCNCRNG: NOT DETECTED
K OXYTOCA DNA BAL NAA+NON-PRB-NCNCRNG: NOT DETECTED
K PNEU GRP DNA BAL NAA+NON-PRB-NCNCRNG: NOT DETECTED
L PNEUMO DNA LOWER RESP QL NAA+NON-PROBE: NOT DETECTED
LYMPHOCYTES NFR FLD MANUAL: 24 %
M CATARRHALIS DNA BAL NAA+NON-PRB-NCNCRNG: NOT DETECTED
M PNEUMO IGG SER IA-ACNC: NOT DETECTED
MACROPHAGE FLUID: 14 %
MECA+MECC ISLT/SPM QL: NORMAL
NDM GENE: NORMAL
NEUTROPHILS NFR FLD MANUAL: 20 %
NIGHT BLUE STAIN TISS: NORMAL
P AERUGINOSA DNA BAL NAA+NON-PRB-NCNCRNG: NOT DETECTED
PROTEUS SP DNA BAL NAA+NON-PRB-NCNCRNG: NOT DETECTED
QT INTERVAL: 432 MS
QTC INTERVAL: 420 MS
RHINOVIRUS RNA SPEC NAA+PROBE: NOT DETECTED
RSV RNA NPH QL NAA+NON-PROBE: NOT DETECTED
S AUREUS DNA BAL NAA+NON-PRB-NCNCRNG: NOT DETECTED
S MARCESCENS DNA BAL NAA+NON-PRB-NCNCRNG: NOT DETECTED
S PNEUM DNA BAL NAA+NON-PRB-NCNCRNG: NOT DETECTED
S PYO DNA BAL NAA+NON-PRB-NCNCRNG: NOT DETECTED
VISUAL PRESENCE OF BLOOD: NORMAL

## 2024-02-19 PROCEDURE — 31654 BRONCH EBUS IVNTJ PERPH LES: CPT | Performed by: INTERNAL MEDICINE

## 2024-02-19 PROCEDURE — 31623 DX BRONCHOSCOPE/BRUSH: CPT | Performed by: INTERNAL MEDICINE

## 2024-02-19 PROCEDURE — 31645 BRNCHSC W/THER ASPIR 1ST: CPT | Performed by: INTERNAL MEDICINE

## 2024-02-19 PROCEDURE — 89051 BODY FLUID CELL COUNT: CPT | Performed by: INTERNAL MEDICINE

## 2024-02-19 PROCEDURE — 99215 OFFICE O/P EST HI 40 MIN: CPT | Performed by: INTERNAL MEDICINE

## 2024-02-19 PROCEDURE — 88104 CYTOPATH FL NONGYN SMEARS: CPT | Performed by: INTERNAL MEDICINE

## 2024-02-19 PROCEDURE — 88108 CYTOPATH CONCENTRATE TECH: CPT | Performed by: INTERNAL MEDICINE

## 2024-02-19 PROCEDURE — C1726 CATH, BAL DIL, NON-VASCULAR: HCPCS | Performed by: INTERNAL MEDICINE

## 2024-02-19 PROCEDURE — 87633 RESP VIRUS 12-25 TARGETS: CPT | Performed by: INTERNAL MEDICINE

## 2024-02-19 PROCEDURE — 87205 SMEAR GRAM STAIN: CPT | Performed by: INTERNAL MEDICINE

## 2024-02-19 PROCEDURE — 87102 FUNGUS ISOLATION CULTURE: CPT | Performed by: INTERNAL MEDICINE

## 2024-02-19 PROCEDURE — 25010000002 DEXAMETHASONE PER 1 MG: Performed by: NURSE ANESTHETIST, CERTIFIED REGISTERED

## 2024-02-19 PROCEDURE — 88173 CYTOPATH EVAL FNA REPORT: CPT | Performed by: INTERNAL MEDICINE

## 2024-02-19 PROCEDURE — 88305 TISSUE EXAM BY PATHOLOGIST: CPT | Performed by: INTERNAL MEDICINE

## 2024-02-19 PROCEDURE — 87071 CULTURE AEROBIC QUANT OTHER: CPT | Performed by: INTERNAL MEDICINE

## 2024-02-19 PROCEDURE — 87206 SMEAR FLUORESCENT/ACID STAI: CPT | Performed by: INTERNAL MEDICINE

## 2024-02-19 PROCEDURE — 31652 BRONCH EBUS SAMPLNG 1/2 NODE: CPT | Performed by: INTERNAL MEDICINE

## 2024-02-19 PROCEDURE — 93005 ELECTROCARDIOGRAM TRACING: CPT | Performed by: ANESTHESIOLOGY

## 2024-02-19 PROCEDURE — 25810000003 LACTATED RINGERS PER 1000 ML: Performed by: ANESTHESIOLOGY

## 2024-02-19 PROCEDURE — 87116 MYCOBACTERIA CULTURE: CPT | Performed by: INTERNAL MEDICINE

## 2024-02-19 PROCEDURE — 25010000002 MIDAZOLAM PER 1MG: Performed by: ANESTHESIOLOGY

## 2024-02-19 PROCEDURE — 93010 ELECTROCARDIOGRAM REPORT: CPT | Performed by: SPECIALIST

## 2024-02-19 PROCEDURE — 25010000002 SUGAMMADEX 200 MG/2ML SOLUTION: Performed by: NURSE ANESTHETIST, CERTIFIED REGISTERED

## 2024-02-19 PROCEDURE — 31624 DX BRONCHOSCOPE/LAVAGE: CPT | Performed by: INTERNAL MEDICINE

## 2024-02-19 PROCEDURE — 25010000002 ONDANSETRON PER 1 MG: Performed by: NURSE ANESTHETIST, CERTIFIED REGISTERED

## 2024-02-19 PROCEDURE — 25010000002 PROPOFOL 10 MG/ML EMULSION: Performed by: NURSE ANESTHETIST, CERTIFIED REGISTERED

## 2024-02-19 PROCEDURE — 76000 FLUOROSCOPY <1 HR PHYS/QHP: CPT

## 2024-02-19 PROCEDURE — 87070 CULTURE OTHR SPECIMN AEROBIC: CPT | Performed by: INTERNAL MEDICINE

## 2024-02-19 PROCEDURE — 31627 NAVIGATIONAL BRONCHOSCOPY: CPT | Performed by: INTERNAL MEDICINE

## 2024-02-19 RX ORDER — MULTIPLE VITAMINS W/ MINERALS TAB 9MG-400MCG
1 TAB ORAL DAILY
COMMUNITY

## 2024-02-19 RX ORDER — OXYCODONE HYDROCHLORIDE 5 MG/1
5 TABLET ORAL
Status: DISCONTINUED | OUTPATIENT
Start: 2024-02-19 | End: 2024-02-19 | Stop reason: HOSPADM

## 2024-02-19 RX ORDER — LIDOCAINE HYDROCHLORIDE 20 MG/ML
INJECTION, SOLUTION EPIDURAL; INFILTRATION; INTRACAUDAL; PERINEURAL AS NEEDED
Status: DISCONTINUED | OUTPATIENT
Start: 2024-02-19 | End: 2024-02-19 | Stop reason: SURG

## 2024-02-19 RX ORDER — KETAMINE HCL IN NACL, ISO-OSM 100MG/10ML
SYRINGE (ML) INJECTION AS NEEDED
Status: DISCONTINUED | OUTPATIENT
Start: 2024-02-19 | End: 2024-02-19 | Stop reason: SURG

## 2024-02-19 RX ORDER — ROCURONIUM BROMIDE 10 MG/ML
INJECTION, SOLUTION INTRAVENOUS AS NEEDED
Status: DISCONTINUED | OUTPATIENT
Start: 2024-02-19 | End: 2024-02-19 | Stop reason: SURG

## 2024-02-19 RX ORDER — PROMETHAZINE HYDROCHLORIDE 12.5 MG/1
25 TABLET ORAL ONCE AS NEEDED
Status: DISCONTINUED | OUTPATIENT
Start: 2024-02-19 | End: 2024-02-19 | Stop reason: HOSPADM

## 2024-02-19 RX ORDER — PROMETHAZINE HYDROCHLORIDE 25 MG/1
25 SUPPOSITORY RECTAL ONCE AS NEEDED
Status: DISCONTINUED | OUTPATIENT
Start: 2024-02-19 | End: 2024-02-19 | Stop reason: HOSPADM

## 2024-02-19 RX ORDER — MEPERIDINE HYDROCHLORIDE 25 MG/ML
12.5 INJECTION INTRAMUSCULAR; INTRAVENOUS; SUBCUTANEOUS
Status: DISCONTINUED | OUTPATIENT
Start: 2024-02-19 | End: 2024-02-19 | Stop reason: HOSPADM

## 2024-02-19 RX ORDER — ACETAMINOPHEN 500 MG
1000 TABLET ORAL ONCE
Status: COMPLETED | OUTPATIENT
Start: 2024-02-19 | End: 2024-02-19

## 2024-02-19 RX ORDER — PROPOFOL 10 MG/ML
VIAL (ML) INTRAVENOUS AS NEEDED
Status: DISCONTINUED | OUTPATIENT
Start: 2024-02-19 | End: 2024-02-19 | Stop reason: SURG

## 2024-02-19 RX ORDER — MIDAZOLAM HYDROCHLORIDE 2 MG/2ML
0.5 INJECTION, SOLUTION INTRAMUSCULAR; INTRAVENOUS ONCE
Status: COMPLETED | OUTPATIENT
Start: 2024-02-19 | End: 2024-02-19

## 2024-02-19 RX ORDER — SODIUM CHLORIDE 9 MG/ML
40 INJECTION, SOLUTION INTRAVENOUS AS NEEDED
Status: DISCONTINUED | OUTPATIENT
Start: 2024-02-19 | End: 2024-02-19 | Stop reason: HOSPADM

## 2024-02-19 RX ORDER — ONDANSETRON 2 MG/ML
INJECTION INTRAMUSCULAR; INTRAVENOUS AS NEEDED
Status: DISCONTINUED | OUTPATIENT
Start: 2024-02-19 | End: 2024-02-19 | Stop reason: SURG

## 2024-02-19 RX ORDER — SODIUM CHLORIDE, SODIUM LACTATE, POTASSIUM CHLORIDE, CALCIUM CHLORIDE 600; 310; 30; 20 MG/100ML; MG/100ML; MG/100ML; MG/100ML
9 INJECTION, SOLUTION INTRAVENOUS CONTINUOUS PRN
Status: DISCONTINUED | OUTPATIENT
Start: 2024-02-19 | End: 2024-02-19 | Stop reason: HOSPADM

## 2024-02-19 RX ORDER — DEXAMETHASONE SODIUM PHOSPHATE 4 MG/ML
INJECTION, SOLUTION INTRA-ARTICULAR; INTRALESIONAL; INTRAMUSCULAR; INTRAVENOUS; SOFT TISSUE AS NEEDED
Status: DISCONTINUED | OUTPATIENT
Start: 2024-02-19 | End: 2024-02-19 | Stop reason: SURG

## 2024-02-19 RX ORDER — ONDANSETRON 2 MG/ML
4 INJECTION INTRAMUSCULAR; INTRAVENOUS ONCE AS NEEDED
Status: DISCONTINUED | OUTPATIENT
Start: 2024-02-19 | End: 2024-02-19 | Stop reason: HOSPADM

## 2024-02-19 RX ADMIN — SUGAMMADEX 200 MG: 100 INJECTION, SOLUTION INTRAVENOUS at 09:04

## 2024-02-19 RX ADMIN — Medication 50 MG: at 07:59

## 2024-02-19 RX ADMIN — ONDANSETRON HYDROCHLORIDE 4 MG: 2 SOLUTION INTRAMUSCULAR; INTRAVENOUS at 09:00

## 2024-02-19 RX ADMIN — PROPOFOL 150 MCG/KG/MIN: 10 INJECTION, EMULSION INTRAVENOUS at 08:06

## 2024-02-19 RX ADMIN — ROCURONIUM BROMIDE 50 MG: 10 INJECTION, SOLUTION INTRAVENOUS at 07:59

## 2024-02-19 RX ADMIN — DEXAMETHASONE SODIUM PHOSPHATE 8 MG: 4 INJECTION, SOLUTION INTRAMUSCULAR; INTRAVENOUS at 07:54

## 2024-02-19 RX ADMIN — ROCURONIUM BROMIDE 20 MG: 10 INJECTION, SOLUTION INTRAVENOUS at 08:50

## 2024-02-19 RX ADMIN — PROPOFOL 120 MG: 10 INJECTION, EMULSION INTRAVENOUS at 07:59

## 2024-02-19 RX ADMIN — LIDOCAINE HYDROCHLORIDE 60 MG: 20 INJECTION, SOLUTION EPIDURAL; INFILTRATION; INTRACAUDAL; PERINEURAL at 07:59

## 2024-02-19 RX ADMIN — ACETAMINOPHEN 1000 MG: 500 TABLET ORAL at 06:48

## 2024-02-19 RX ADMIN — SODIUM CHLORIDE, POTASSIUM CHLORIDE, SODIUM LACTATE AND CALCIUM CHLORIDE 9 ML/HR: 600; 310; 30; 20 INJECTION, SOLUTION INTRAVENOUS at 06:48

## 2024-02-19 RX ADMIN — MIDAZOLAM HYDROCHLORIDE 0.5 MG: 1 INJECTION, SOLUTION INTRAMUSCULAR; INTRAVENOUS at 07:04

## 2024-02-19 RX ADMIN — SODIUM CHLORIDE, POTASSIUM CHLORIDE, SODIUM LACTATE AND CALCIUM CHLORIDE: 600; 310; 30; 20 INJECTION, SOLUTION INTRAVENOUS at 08:24

## 2024-02-19 NOTE — ANESTHESIA PREPROCEDURE EVALUATION
Anesthesia Evaluation     Patient summary reviewed and Nursing notes reviewed                Airway   Mallampati: I  TM distance: >3 FB  Neck ROM: full  No difficulty expected  Dental      Pulmonary - negative pulmonary ROS and normal exam    breath sounds clear to auscultation  Cardiovascular - negative cardio ROS and normal exam    Rhythm: regular  Rate: normal        Neuro/Psych- negative ROS  GI/Hepatic/Renal/Endo - negative ROS     Musculoskeletal (-) negative ROS    Abdominal    Substance History - negative use     OB/GYN negative ob/gyn ROS         Other        ROS/Med Hx Other: Family history of lung cancer Iron deficiency anemia  Lung nodule seen on imaging study Memory deficit  Never smoked cigarettes Personal history of colonic polyps  Solitary lung nodule                     Anesthesia Plan    ASA 1     general     intravenous induction     Anesthetic plan, risks, benefits, and alternatives have been provided, discussed and informed consent has been obtained with: patient.    CODE STATUS:

## 2024-02-19 NOTE — ANESTHESIA POSTPROCEDURE EVALUATION
Patient: Chinmay Roque    Procedure Summary       Date: 02/19/24 Room / Location: [facility] OR 04 / [facility] MAIN OR    Anesthesia Start: 0753 Anesthesia Stop: 0914    Procedure: BRONCHOSCOPY WITH ION ROBOT, REBUS, EBUS, NEEDLE ASPIRATES, BAL, BRUSHINGS, WASHINGS (Bronchus) Diagnosis:       Solitary lung nodule      Never smoked cigarettes      Family history of lung cancer      (Solitary lung nodule [R91.1])      (Never smoked cigarettes [Z78.9])      (Family history of lung cancer [Z80.1])    Surgeons: Jesse Monteiro MD Provider: Kenneth Valenzuela MD    Anesthesia Type: general ASA Status: 1            Anesthesia Type: general    Vitals  Vitals Value Taken Time   /78 02/19/24 1011   Temp 36.3 °C (97.4 °F) 02/19/24 1011   Pulse 57 02/19/24 1011   Resp 14 02/19/24 1011   SpO2 98 % 02/19/24 1011           Post Anesthesia Care and Evaluation    Patient location during evaluation: bedside  Patient participation: complete - patient participated  Level of consciousness: awake  Pain management: adequate    Airway patency: patent  PONV Status: none  Cardiovascular status: acceptable and stable  Respiratory status: acceptable  Hydration status: acceptable    Comments: An Anesthesiologist personally participated in the most demanding procedures (including induction and emergence if applicable) in the anesthesia plan, monitored the course of anesthesia administration at frequent intervals and remained physically present and available for immediate diagnosis and treatment of emergencies.

## 2024-02-19 NOTE — DISCHARGE INSTRUCTIONS
DISCHARGE INSTRUCTIONS  BRONCHOSCOPY/  ENDOBRONCHIAL ULTRASOUND      For your procedure you had:  General Anesthesia (you may have a sore throat for the first 24 hours)    You may experience dizziness, drowsiness, or lightheadedness for several hours following surgery/procedure.  Do not stay alone today or tonight.  Limit your activity for 24 hours.  You should not drive or operate machinery or drink alcohol for 24 hours or while you are taking pain medication.  You should not sign legally binding documents.  Resume your diet slowly.  Follow any special dietary instructions you may have been given by your doctor.  NOTIFY YOUR DOCTOR IF YOU EXPERIENCE ANY OF THE FOLLOWING:  Temperature greater than 101 degrees Fahrenheit  Shaking Chills  Redness or excessive drainage from incision  Nausea, vomiting and/or pain that is not controlled by prescribed medications  Increase in bleeding or bleeding that is excessive  Unable to urinate in 6 hours after surgery  If unable to reach your doctor, please go to the closest Emergency Room     Following your bronchoscopy, you may experience a mild sore throat or cough up small amounts of blood.  Extremes of either should be reported to your doctor.  If you have sudden shortness of breath, sense of urgency, chest pain, chest pressure that worsens over time or sharp chest pains that worsen with deep breaths or coughs go the ED or call 911.  Smokers are encouraged not to smoke for 6 to 8 hours after the procedure to decrease the risk of coughing and bleeding.  Nausea and vomiting can occur, but is not a common side effect of the procedure you have had today. If you experience any nausea or vomiting, take clear liquids for your next meal.  Coughing (slight dry cough to hacking cough) is completely expected for 3 to 4 days.  Pink/ blood tinged sputum is expected for several days ( or while coughing)  Notify MD or go to the ED if significant blood is found in the sputum.  Missing your  appointment/follow-up could lead to serious complications.    LAST DOSE OF PAIN MEDICATION:  Tylenol (1000mg) last at 6:48am. Do not exceed 4000mg of tylenol in a 24 hour period.

## 2024-02-19 NOTE — CONSULTS
Primary Care Provider  Taylor Blake MD   Referring Provider  Taylor Blake MD      Patient Complaint  No chief complaint on file.      Subjective          Chinmay Roque presents to [facility] MAIN OR      History of Presenting Illness  Chinmay Roque is a 73 y.o. male here for robotic navigational bronchoscopy.  He has a stable 7 x 9 mm right upper lobe lung nodule.  Liquid biopsy showed low risk of malignancy.  This spot has been stable in size x 3 months.   Denies dyspnea, coughing, wheezing, headaches, chest pain, weight loss or hemoptysis. Denies fevers, chills and night sweats.  He is able to perform ADLs without difficulties and denies any swollen glands/lymph nodes in the head or neck.    Patient and his family are very concerned about ruling out lung cancer as they have a family history of lung cancer and a recent family member die from it within the past year.  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    I have personally reviewed the review of systems, past family, social, medical and surgical histories; and agree with their findings.      Family History   Problem Relation Age of Onset    Hyperlipidemia Mother     Cancer Father     Prostate cancer Brother     Colon cancer Neg Hx     Malig Hyperthermia Neg Hx         Social History     Socioeconomic History    Marital status:    Tobacco Use    Smoking status: Never     Passive exposure: Never    Smokeless tobacco: Never   Vaping Use    Vaping Use: Never used   Substance and Sexual Activity    Alcohol use: Yes     Alcohol/week: 2.0 standard drinks of alcohol     Types: 2 Glasses of wine per week     Comment: occasional    Drug use: Never    Sexual activity: Defer     Partners: Female        Past Medical History:   Diagnosis Date    Anemia     HL (hearing loss)     Insomnia     Lung nodule     Psoriasis     scalp    Tinnitus Feb 2005     "    Immunization History   Administered Date(s) Administered    COVID-19 (MODERNA) 1st,2nd,3rd Dose Monovalent 01/18/2021, 02/12/2021, 09/10/2021, 04/21/2022    COVID-19 (PFIZER) BIVALENT 12+YRS 09/28/2022    Fluad Quad 65+ 10/06/2021    Fluzone High-Dose 65+yrs 10/06/2022, 10/04/2023    Hepatitis A 05/01/2018, 11/01/2018    Pneumococcal Conjugate 13-Valent (PCV13) 08/01/2015    Pneumococcal Polysaccharide (PPSV23) 04/01/2016    Shingrix 06/01/2019, 08/01/2019    Tdap 05/01/2017, 04/11/2020         No Known Allergies       Current Facility-Administered Medications:     lactated ringers infusion, 9 mL/hr, Intravenous, Continuous PRN, Jonathan Weber MD, Last Rate: 9 mL/hr at 02/19/24 0648, 9 mL/hr at 02/19/24 0648    sodium chloride 0.9 % infusion 40 mL, 40 mL, Intravenous, PRN, Jonathan Weber MD         Objective     Vital Signs:   /83 (BP Location: Right arm, Patient Position: Lying)   Pulse 57   Temp 97.1 °F (36.2 °C) (Temporal)   Resp 18   Ht 193 cm (76\")   Wt 95.1 kg (209 lb 10.5 oz)   SpO2 99%   BMI 25.52 kg/m²     Physical Exam  Vital Signs Reviewed   WDWN, Alert, NAD.    HEENT:  PERRL, EOMI.  OP, nares clear, no sinus tenderness  Neck:  Supple, no JVD, no thyromegaly  Lymph: no axillary, cervical, supraclavicular lymphadenopathy noted bilaterally  Chest:  good aeration, clear to auscultation bilaterally, tympanic to percussion bilaterally, no work of breathing noted  CV: RRR, no MGR, pulses 2+, equal.  Abd:  Soft, NT, ND, + BS, no HSM  EXT:  no clubbing, no cyanosis, no edema, no joint tenderness  Neuro:  A&Ox3, CN grossly intact, no focal deficits.  Skin: No rashes or lesions noted       Result Review :   Most recent chest CT personally reviewed showing stable lung nodule.  Nofiy lung liquid biopsy shows low risk of malignancy.  I personally reviewed a CT of the neck, chest CT in October 2023 and recent chest CT.  There is a stable right upper lobe lung nodule that is slightly spiculated.  It " measures roughly 7 x 9 mm.  There are couple of other tiny nodules both solid and groundglass that are roughly 3 mm in size or less.  I do not see any suspicious mediastinal adenopathy or any other parenchymal pulmonary abnormalities.  CBC personally reviewed showing 310 peripheral eosinophils.  CMP with no evidence of chronic hypercapnia.             Assessment and Plan        Solitary lung nodule    Never smoked cigarettes    Family history of lung cancer      Impression:  Multiple lung nodules.  Index nodule being right upper lobe 7 x 9 mm  partially spiculated lung nodule.  Stable in size x 3 months  Never smoker  Inhalational exposure to mold, dust  Family history of lung cancer    Plan:  nodifylung liquid biopsy/malignancy risk test less than 2%  The patient and his family have a lot of anxiety about watchful waiting given family history of lung cancer and family members dying of lung cancer within the past year.  They would prefer to go ahead and proceed with tissue diagnosis via biopsy if at all possible.  Based off the size and location of the lung nodule, I quoted them a 70% chance of obtaining a tissue diagnosis via robotic navigational bronchoscopy.  We will proceed today robotic navigational bronchoscopy with possible endobronchial ultrasound/fine-needle aspiration  I have discussed the risks of the procedure with the patient including pneumothorax, hemothorax, bleeding, hypoxia, required mechanical ventilation and death. The patient recognizes these findings, acknowledges these findings and is agreeable to the procedure.  Personally reviewed noncontrast chest CT via robotic navigational bronchoscopy format  At follow-up, if pathology comes back negative for malignancy, we will still continue to do close follow-up and would then plan for a repeat noncontrast chest CT in 3 months.  Smoking status: Never smoker  Vaccination status: Up-to-date with Prevnar, Pneumovax, flu vaccine  Medications personally  reviewed.      Follow Up   After biopsies come back  Patient was given instructions and counseling regarding his condition or for health maintenance advice. Please see specific information pulled into the AVS if appropriate.     .Electronically signed by Jesse Monteiro MD, 02/19/24, 7:13 AM EST.

## 2024-02-19 NOTE — OP NOTE
Robotic navigational assisted bronchoscopy with radial probe endobronchial ultrasound, brushings, bronchoalveolar lavage, bronchial washings.  Bronchoscopy with linear endobronchial ultrasound/fine-needle aspiration.  Bronchoscopy with clearance of airways    Pre-Operative Diagnosis: Lung nodule     Post-Operative Diagnosis: Same     Timeout performed     Anesthesia: General anesthesia     Procedure Details: The patient was consented for the procedure with all risk and benefit of the procedure explained in detail.  He was given the opportunity to ask questions and all concerns were answered. The bronchoscope was inserted into the main airway via the endotracheal tube. An anatomical survey was done of the main airways and the subsegmental bronchus.  There were thick viscous cloudy secretions obstructing the right and left upper lobe bronchi.  These were suctioned and removed.     Findings: First, using fiberoptic bronchoscope airway inspection was performed.  Endotracheal tube was in adequate position in the mid thoracic trachea.  The trachea was normal in caliber and had no mucosal abnormalities. The left tracheobronchial tree appeared anatomically normal with no mucosal abnormalities. The right tracheobronchial tree appeared anatomically normal with no mucosal abnormalities.  All airways were evaluated and cleared.    Next the fiberoptic bronchoscope was removed and Ion robotic navigational bronchoscope was inserted into the endotracheal tube.  Using navigational guidance, we approach to the lung nodule in the right upper lobe of lung.  Despite multiple attempts, I cannot get a signal with radial probe ultrasound at the right upper lobe lung nodule.  I did do multiple different airways.  Brushings  were performed under fluoroscopic guidance at the right upper lobe bronchus. A bronchoalveolar lavage was performed using 20 mL aliquots of normal saline  instilled into the right upper lobe bronchus then aspirated back.  There was 15 mL reddish fluid in return.  Bronchial washings were collected.    After Ion navigational bronchoscope was removed, linear endobronchial ultrasound was inserted through the endotracheal tube. Using ultrasound, the following lymph nodes were identified and found to be adequate for samplin R.  These lymph nodes appeared normal in size and shape. Using endobronchial ultrasound, fine-needle aspiration was performed 11 R. 4 samples were taken from the following sites: 11 R.      Findings:  Mucous plugging left and right upper lobe bronchi with thick viscous cloudy secretions  Unsuccessful radial probe ultra identification of right upper lobe lung nodule  Normal-appearing lymph node at 11 R under ultrasound guidance     Estimated Blood Loss: Less than 10 mL     Specimens:  Brushings Right upper lobe bronchus  Bronchoalveolar lavage right upper lobe bronchus  Bronchial washings  Fine-needle aspiration 11 R lymph node under ultrasound guidance     Complications: None; patient tolerated the procedure well.     Disposition: Stable to discharge home.  Follow-up test results.     Patient tolerated the procedure well.    Electronically signed by Jesse Monteiro MD, 24, 12:14 PM EST.

## 2024-02-21 LAB
BACTERIA SPEC AEROBE CULT: NO GROWTH
BACTERIA SPEC RESP CULT: NORMAL
CYTO UR: NORMAL
GRAM STN SPEC: NORMAL
LAB AP CASE REPORT: NORMAL
LAB AP CLINICAL INFORMATION: NORMAL
PATH REPORT.FINAL DX SPEC: NORMAL
PATH REPORT.GROSS SPEC: NORMAL

## 2024-02-23 ENCOUNTER — TELEPHONE (OUTPATIENT)
Dept: PULMONOLOGY | Facility: CLINIC | Age: 74
End: 2024-02-23
Payer: MEDICARE

## 2024-03-12 ENCOUNTER — OFFICE VISIT (OUTPATIENT)
Dept: PULMONOLOGY | Facility: CLINIC | Age: 74
End: 2024-03-12
Payer: MEDICARE

## 2024-03-12 VITALS
DIASTOLIC BLOOD PRESSURE: 68 MMHG | SYSTOLIC BLOOD PRESSURE: 130 MMHG | WEIGHT: 210.8 LBS | HEIGHT: 76 IN | BODY MASS INDEX: 25.67 KG/M2 | OXYGEN SATURATION: 97 % | HEART RATE: 71 BPM | RESPIRATION RATE: 16 BRPM

## 2024-03-12 DIAGNOSIS — Z77.120 MOLD EXPOSURE: ICD-10-CM

## 2024-03-12 DIAGNOSIS — R91.1 SOLITARY LUNG NODULE: Primary | ICD-10-CM

## 2024-03-12 DIAGNOSIS — Z80.1 FAMILY HISTORY OF LUNG CANCER: ICD-10-CM

## 2024-03-12 DIAGNOSIS — Z78.9 NEVER SMOKED CIGARETTES: ICD-10-CM

## 2024-03-18 LAB
FUNGUS WND CULT: NORMAL
FUNGUS WND CULT: NORMAL

## 2024-04-01 LAB
MYCOBACTERIUM SPEC CULT: NORMAL
MYCOBACTERIUM SPEC CULT: NORMAL
NIGHT BLUE STAIN TISS: NORMAL

## 2024-05-20 ENCOUNTER — OFFICE VISIT (OUTPATIENT)
Dept: INTERNAL MEDICINE | Facility: CLINIC | Age: 74
End: 2024-05-20
Payer: MEDICARE

## 2024-05-20 VITALS
SYSTOLIC BLOOD PRESSURE: 124 MMHG | OXYGEN SATURATION: 95 % | TEMPERATURE: 97.3 F | BODY MASS INDEX: 25.18 KG/M2 | HEART RATE: 62 BPM | WEIGHT: 206.8 LBS | DIASTOLIC BLOOD PRESSURE: 66 MMHG | HEIGHT: 76 IN | RESPIRATION RATE: 18 BRPM

## 2024-05-20 DIAGNOSIS — F03.B0 MODERATE DEMENTIA, UNSPECIFIED DEMENTIA TYPE, UNSPECIFIED WHETHER BEHAVIORAL, PSYCHOTIC, OR MOOD DISTURBANCE OR ANXIETY: ICD-10-CM

## 2024-05-20 DIAGNOSIS — H93.13 TINNITUS OF BOTH EARS: ICD-10-CM

## 2024-05-20 DIAGNOSIS — R41.3 MEMORY DEFICIT: ICD-10-CM

## 2024-05-20 DIAGNOSIS — D50.9 IRON DEFICIENCY ANEMIA, UNSPECIFIED IRON DEFICIENCY ANEMIA TYPE: Primary | ICD-10-CM

## 2024-05-20 DIAGNOSIS — R91.1 SOLITARY LUNG NODULE: ICD-10-CM

## 2024-05-20 PROCEDURE — 99214 OFFICE O/P EST MOD 30 MIN: CPT | Performed by: INTERNAL MEDICINE

## 2024-05-20 PROCEDURE — G2211 COMPLEX E/M VISIT ADD ON: HCPCS | Performed by: INTERNAL MEDICINE

## 2024-05-20 PROCEDURE — 1126F AMNT PAIN NOTED NONE PRSNT: CPT | Performed by: INTERNAL MEDICINE

## 2024-05-20 RX ORDER — BETAMETHASONE DIPROPIONATE 0.5 MG/G
LOTION TOPICAL DAILY
Qty: 180 ML | Refills: 1 | Status: SHIPPED | OUTPATIENT
Start: 2024-05-20

## 2024-05-20 RX ORDER — DONEPEZIL HYDROCHLORIDE 10 MG/1
10 TABLET, FILM COATED ORAL DAILY
Qty: 90 TABLET | Refills: 1 | Status: SHIPPED | OUTPATIENT
Start: 2024-05-20

## 2024-05-20 RX ORDER — ETODOLAC 400 MG/1
400 TABLET, EXTENDED RELEASE ORAL 2 TIMES DAILY WITH MEALS
Qty: 180 TABLET | Refills: 1 | Status: SHIPPED | OUTPATIENT
Start: 2024-05-20

## 2024-05-20 RX ORDER — SILDENAFIL CITRATE 20 MG/1
20 TABLET ORAL DAILY PRN
Qty: 90 TABLET | Refills: 1 | Status: SHIPPED | OUTPATIENT
Start: 2024-05-20

## 2024-05-20 RX ORDER — NYSTATIN AND TRIAMCINOLONE ACETONIDE 100000; 1 [USP'U]/G; MG/G
1 OINTMENT TOPICAL 2 TIMES DAILY
Qty: 180 G | Refills: 4 | Status: SHIPPED | OUTPATIENT
Start: 2024-05-20

## 2024-05-20 NOTE — PROGRESS NOTES
"Chief Complaint  memory deficit, iron deficiency anemia (4 month follow up), and  Pulmonary emphysema    Subjective      Chinmay Roque is a 73 y.o. male who presents to [facility]     States that things are about the same  Ringing is about the same not much worse  He is tolerating Aricept well  Has not noticed improvement with this  Breathing well  No chest discomfort  Reviewed note from Dr. Monteiro they are planning on watchful waiting of his lung lesion at this point    He is actively trying to lose weight    Otherwise no concerns today    Objective   Vital Signs:   Vitals:    05/20/24 0820   BP: 124/66   BP Location: Left arm   Patient Position: Sitting   Cuff Size: Adult   Pulse: 62   Resp: 18   Temp: 97.3 °F (36.3 °C)   TempSrc: Temporal   SpO2: 95%   Weight: 93.8 kg (206 lb 12.8 oz)   Height: 193 cm (75.98\")     Body mass index is 25.18 kg/m².    Wt Readings from Last 3 Encounters:   05/20/24 93.8 kg (206 lb 12.8 oz)   03/12/24 95.6 kg (210 lb 12.8 oz)   02/19/24 95.1 kg (209 lb 10.5 oz)     BP Readings from Last 3 Encounters:   05/20/24 124/66   03/12/24 130/68   02/19/24 171/79       Health Maintenance   Topic Date Due    RSV Vaccine - Adults (1 - 1-dose 60+ series) Never done    COVID-19 Vaccine (6 - 2023-24 season) 09/01/2023    ANNUAL WELLNESS VISIT  05/22/2024    INFLUENZA VACCINE  08/01/2024    BMI FOLLOWUP  01/09/2025    COLORECTAL CANCER SCREENING  11/28/2028    TDAP/TD VACCINES (4 - Td or Tdap) 04/15/2034    HEPATITIS C SCREENING  Completed    Pneumococcal Vaccine 65+  Completed    ZOSTER VACCINE  Completed       Physical Exam  Vitals reviewed.   Constitutional:       Appearance: Normal appearance. He is well-developed.   HENT:      Head: Normocephalic and atraumatic.      Right Ear: External ear normal.      Left Ear: External ear normal.   Eyes:      Conjunctiva/sclera: Conjunctivae normal.      Pupils: Pupils are equal, round, and reactive to " light.   Cardiovascular:      Rate and Rhythm: Normal rate and regular rhythm.      Heart sounds: No murmur heard.     No friction rub. No gallop.   Pulmonary:      Effort: Pulmonary effort is normal.      Breath sounds: Normal breath sounds. No wheezing or rhonchi.   Skin:     General: Skin is warm and dry.   Neurological:      Mental Status: He is alert and oriented to person, place, and time.   Psychiatric:         Mood and Affect: Affect normal.         Behavior: Behavior normal.         Thought Content: Thought content normal.          Result Review :  The following data was reviewed by: Taylor Blake MD on 05/20/2024:         Procedures          Assessment & Plan  Memory deficit  Will continue Aricept  Iron deficiency anemia, unspecified iron deficiency anemia type  Improving continue to monitor  Solitary lung nodule  Continue watchful waiting with pulmonology  Moderate dementia, unspecified dementia type, unspecified whether behavioral, psychotic, or mood disturbance or anxiety    Tinnitus of both ears  Stable continue to monitor     New Medications Ordered This Visit   Medications    betamethasone dipropionate 0.05 % lotion     Sig: Apply  topically to the appropriate area as directed Daily.     Dispense:  180 mL     Refill:  1    nystatin-triamcinolone (MYCOLOG) 356759-0.1 UNIT/GM-% ointment     Sig: Apply 1 Application topically to the appropriate area as directed 2 (Two) Times a Day.     Dispense:  180 g     Refill:  4    etodolac XL (LODINE XL) 400 MG 24 hr tablet     Sig: Take 1 tablet by mouth 2 (Two) Times a Day With Meals.     Dispense:  180 tablet     Refill:  1    donepezil (ARICEPT) 10 MG tablet     Sig: Take 1 tablet by mouth Daily.     Dispense:  90 tablet     Refill:  1    sildenafil (REVATIO) 20 MG tablet     Sig: Take 1 tablet by mouth Daily As Needed (Sexual Activity). Take 2-4 tablets 1 hour prior to sexual activity as needed     Dispense:  90 tablet     Refill:  1                     FOLLOW UP  Return in about 6 months (around 11/20/2024).  Patient was given instructions and counseling regarding his condition or for health maintenance advice. Please see specific information pulled into the AVS if appropriate.       Taylor Blake MD  05/20/24  08:52 EDT    CURRENT & DISCONTINUED MEDICATIONS  Current Outpatient Medications   Medication Instructions    amoxicillin-clavulanate (AUGMENTIN) 875-125 MG per tablet 1 tablet, Oral, 2 Times Daily PRN, As needed     aspirin 81 mg, Oral, Daily    B Complex Vitamins (VITAMIN B COMPLEX 100 IJ) vitamin B complex oral capsule take 1 capsule by oral route daily   Active    betamethasone dipropionate 0.05 % lotion Topical, Daily    clonazePAM (KLONOPIN) 1 mg, Nightly    donepezil (ARICEPT) 10 mg, Oral, Daily    etodolac XL (LODINE XL) 400 mg, Oral, 2 Times Daily With Meals    multivitamin with minerals tablet tablet 1 tablet, Oral, Daily    nystatin-triamcinolone (MYCOLOG) 644596-0.1 UNIT/GM-% ointment 1 Application, Topical, 2 Times Daily    polysacchar iron-FA-B12 (Ferrex 150 Forte) 150-1-25 MG-MG-MCG capsule capsule 1 capsule, Oral, 2 Times Daily    QUEtiapine (SEROQUEL) 400-800 mg, Oral, Nightly    sildenafil (REVATIO) 20 mg, Oral, Daily PRN, Take 2-4 tablets 1 hour prior to sexual activity as needed    vitamin D3 5,000 Units, Oral, Daily       Medications Discontinued During This Encounter   Medication Reason    sildenafil (REVATIO) 20 MG tablet Reorder    donepezil (ARICEPT) 10 MG tablet Reorder    etodolac XL (LODINE XL) 400 MG 24 hr tablet Reorder    betamethasone dipropionate 0.05 % lotion Reorder    nystatin-triamcinolone (MYCOLOG) 791037-7.1 UNIT/GM-% ointment Reorder

## 2024-07-01 ENCOUNTER — HOSPITAL ENCOUNTER (OUTPATIENT)
Dept: CT IMAGING | Facility: HOSPITAL | Age: 74
Discharge: HOME | End: 2024-07-01
Admitting: INTERNAL MEDICINE
Payer: MEDICARE

## 2024-07-01 DIAGNOSIS — R91.1 LUNG NODULE SEEN ON IMAGING STUDY: ICD-10-CM

## 2024-07-01 PROCEDURE — 71250 CT THORAX DX C-: CPT

## 2024-09-09 ENCOUNTER — TELEPHONE (OUTPATIENT)
Dept: UROLOGY | Facility: CLINIC | Age: 74
End: 2024-09-09
Payer: MEDICARE

## 2024-09-09 ENCOUNTER — OFFICE VISIT (OUTPATIENT)
Dept: PULMONOLOGY | Facility: CLINIC | Age: 74
End: 2024-09-09
Payer: MEDICARE

## 2024-09-09 VITALS
RESPIRATION RATE: 16 BRPM | WEIGHT: 208.2 LBS | TEMPERATURE: 97.6 F | HEART RATE: 70 BPM | BODY MASS INDEX: 25.35 KG/M2 | DIASTOLIC BLOOD PRESSURE: 65 MMHG | SYSTOLIC BLOOD PRESSURE: 137 MMHG | OXYGEN SATURATION: 97 % | HEIGHT: 76 IN

## 2024-09-09 DIAGNOSIS — Z77.120 MOLD EXPOSURE: ICD-10-CM

## 2024-09-09 DIAGNOSIS — R91.1 SOLITARY LUNG NODULE: Primary | ICD-10-CM

## 2024-09-09 DIAGNOSIS — Z78.9 NEVER SMOKED CIGARETTES: ICD-10-CM

## 2024-09-09 DIAGNOSIS — Z80.1 FAMILY HISTORY OF LUNG CANCER: ICD-10-CM

## 2024-09-09 PROCEDURE — 1160F RVW MEDS BY RX/DR IN RCRD: CPT | Performed by: INTERNAL MEDICINE

## 2024-09-09 PROCEDURE — 99214 OFFICE O/P EST MOD 30 MIN: CPT | Performed by: INTERNAL MEDICINE

## 2024-09-09 PROCEDURE — 1159F MED LIST DOCD IN RCRD: CPT | Performed by: INTERNAL MEDICINE

## 2024-09-09 RX ORDER — QUETIAPINE FUMARATE 100 MG/1
TABLET, FILM COATED ORAL
COMMUNITY
Start: 2024-08-28

## 2024-09-09 NOTE — PROGRESS NOTES
Primary Care Provider  Taylor Blake MD   Referring Provider  No ref. provider found      Patient Complaint  Follow-up (6 month f/up - CT 7/1 )      Subjective          Chinmay Roque presents to [facility]      History of Presenting Illness  Chinmay Roque is a 74 y.o. male here for follow-up for solitary lung nodule.  Since last visit patient's chest CT has been stable showing stable right upper lobe lung nodule that is roughly 6 to 8 mm.  Has been stable in size for about 1 year. Nodify lung was very low risk for pulmonary malignancy earlier in the year.  Patient underwent robotic navigational bronchoscopy earlier this year that was negative for malignant cells.  From a pulmonary perspective, he is asymptomatic and denies any complaints. Denies dyspnea, coughing, wheezing, headaches, chest pain, weight loss or hemoptysis. Denies fevers, chills and night sweats.  He is able to perform ADLs without difficulties and denies any swollen glands/lymph nodes in the head or neck.    Patient and family had high level of concern given his family history of lung cancer    Family History   Problem Relation Age of Onset    Hyperlipidemia Mother     Cancer Father     Prostate cancer Brother     Colon cancer Neg Hx     Malig Hyperthermia Neg Hx         Social History     Socioeconomic History    Marital status:    Tobacco Use    Smoking status: Never     Passive exposure: Never    Smokeless tobacco: Never   Vaping Use    Vaping status: Never Used   Substance and Sexual Activity    Alcohol use: Yes     Alcohol/week: 2.0 standard drinks of alcohol     Types: 2 Glasses of wine per week     Comment: occasional    Drug use: Never    Sexual activity: Yes     Partners: Female        Past Medical History:   Diagnosis Date    Anemia     HL (hearing loss)     Insomnia     Lung nodule     Psoriasis     scalp    Tinnitus Feb 2005        Immunization History   Administered  Date(s) Administered    COVID-19 (MODERNA) 1st,2nd,3rd Dose Monovalent 01/18/2021, 02/12/2021, 09/10/2021, 04/21/2022    COVID-19 (PFIZER) BIVALENT 12+YRS 09/28/2022    Fluad Quad 65+ 10/06/2021    Fluzone High-Dose 65+yrs 10/06/2022, 10/04/2023    Hepatitis A 05/01/2018, 11/01/2018    Pneumococcal Conjugate 13-Valent (PCV13) 08/01/2015    Pneumococcal Polysaccharide (PPSV23) 04/01/2016    Shingrix 06/01/2019, 08/01/2019    Tdap 05/01/2017, 04/11/2020, 04/15/2024         No Known Allergies       Current Outpatient Medications:     aspirin 81 MG chewable tablet, Chew 1 tablet Daily., Disp: 90 tablet, Rfl: 1    B Complex Vitamins (VITAMIN B COMPLEX 100 IJ), vitamin B complex oral capsule take 1 capsule by oral route daily   Active, Disp: , Rfl:     betamethasone dipropionate 0.05 % lotion, Apply  topically to the appropriate area as directed Daily., Disp: 180 mL, Rfl: 1    clonazePAM (KlonoPIN) 1 MG tablet, 1 tablet Every Night., Disp: , Rfl:     donepezil (ARICEPT) 10 MG tablet, Take 1 tablet by mouth Daily., Disp: 90 tablet, Rfl: 1    etodolac XL (LODINE XL) 400 MG 24 hr tablet, Take 1 tablet by mouth 2 (Two) Times a Day With Meals., Disp: 180 tablet, Rfl: 1    multivitamin with minerals tablet tablet, Take 1 tablet by mouth Daily., Disp: , Rfl:     nystatin-triamcinolone (MYCOLOG) 964158-9.1 UNIT/GM-% ointment, Apply 1 Application topically to the appropriate area as directed 2 (Two) Times a Day., Disp: 180 g, Rfl: 4    polysacchar iron-FA-B12 (Ferrex 150 Forte) 150-1-25 MG-MG-MCG capsule capsule, Take 1 capsule by mouth 2 (Two) Times a Day., Disp: 180 capsule, Rfl: 1    QUEtiapine (SEROquel) 100 MG tablet, take 4 to 8 tablets BY MOUTH EVERY night, Disp: , Rfl:     QUEtiapine (SEROquel) 400 MG tablet, Take 1-2 tablets by mouth Every Night., Disp: 180 tablet, Rfl: 2    sildenafil (REVATIO) 20 MG tablet, Take 1 tablet by mouth Daily As Needed (Sexual Activity). Take 2-4 tablets 1 hour prior to sexual activity as  "needed, Disp: 90 tablet, Rfl: 1    vitamin D3 125 MCG (5000 UT) capsule capsule, Take 1 capsule by mouth Daily., Disp: , Rfl:          Objective     Vital Signs:   /65 (BP Location: Right arm, Patient Position: Sitting, Cuff Size: Large Adult)   Pulse 70   Temp 97.6 °F (36.4 °C) (Oral)   Resp 16   Ht 193 cm (76\")   Wt 94.4 kg (208 lb 3.2 oz)   SpO2 97% Comment: RA  BMI 25.34 kg/m²     Physical Exam  Vital Signs Reviewed   WDWN, Alert, NAD.    HEENT:  PERRL, EOMI.  OP, nares clear  Chest:  good aeration, clear to auscultation bilaterally, tympanic to percussion bilaterally, no work of breathing noted  CV: RRR, no MGR, pulses 2+, equal.  Abd:  Soft, NT, ND, + BS, no HSM  EXT:  no clubbing, no cyanosis, no edema  Neuro:  A&Ox3, CN grossly intact, no focal deficits.  Skin: No rashes or lesions noted       Result Review :   I have personally reviewed my last office note.  I personally reviewed multiple chest CT showing stable lung nodules, largest being right upper lobe 9 mm nodule is partially spiculated.  This is stable in size over 12 months.  Nodify lung liquid biopsy shows a less than 2% risk of pulmonary malignancy.  Robotic navigational bronchoscopy biopsy results reviewed showing no evidence of malignant cells.         Assessment and Plan        * No active hospital problems. *      Impression:  Multiple lung nodules.  Index nodule being right upper lobe 7 x 9 mm  partially spiculated lung nodule.  Stable in size x 12 months.  Liquid biopsy shows very low risk of lung cancer.  Robotic navigational bronchoscopy was negative for malignant cells  Never smoker  Inhalational exposure to mold, dust  Family history of lung cancer    Plan:  Discussed with the patient and family that all findings are stable for roughly 1 year and this appears consistent with a granuloma.  I have recommended ongoing surveillance imaging per Fleischner criteria.  We we will repeat noncontrast chest CT in 12 months.  If stable " in size, can stop checking chest CTs as that will be 24 months of stability  Provided patient and family reassurance that I feel that this nodule is benign and we will do close surveillance over the next 2 years  Encourage activity  Smoking status: Never smoker  Vaccination status: Up-to-date with Prevnar, Pneumovax, flu vaccine.  Defers RSV vaccine at this time  Medications personally reviewed.    Follow Up   Return in about 1 year (around 9/9/2025).  Patient was given instructions and counseling regarding his condition or for health maintenance advice. Please see specific information pulled into the AVS if appropriate.     Electronically signed by Jesse Monteiro MD, 09/09/24, 8:53 AM EDT.

## 2024-09-09 NOTE — TELEPHONE ENCOUNTER
Caller: PAUL GILLIS    Relationship to patient: SPOUSE    Best call back number: [phone]    Chief complaint: ESTABLISHED PATIENT OF DR NAJERA WITH EJACULATION ISSUES, WHICH IS A NEW PROBLEM. WANTING TO MAKE AN APPOINTMENT ON A MONDAY OR TUESDAY. PLEASE REACH OUT     Type of visit: FOLLOW UP NEW PROBLEM     BEST NUMBER [phone] WHICH IS HIS WIFES NUMBER. YOU MAY LEAVE A MESSAGE IF NO ANSWER    HUB AGENT UNABLE TO WARM TRANSFER

## 2024-10-26 PROBLEM — N40.1 BENIGN PROSTATIC HYPERPLASIA WITH LOWER URINARY TRACT SYMPTOMS: Status: ACTIVE | Noted: 2024-10-26
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
## 2024-10-29 ENCOUNTER — OFFICE VISIT (OUTPATIENT)
Dept: UROLOGY | Age: 74
End: 2024-10-29
Payer: MEDICARE

## 2024-10-29 VITALS — RESPIRATION RATE: 18 BRPM | BODY MASS INDEX: 24.5 KG/M2 | WEIGHT: 201.2 LBS | HEIGHT: 76 IN

## 2024-10-29 DIAGNOSIS — N40.1 BENIGN PROSTATIC HYPERPLASIA WITH LOWER URINARY TRACT SYMPTOMS, SYMPTOM DETAILS UNSPECIFIED: Primary | ICD-10-CM

## 2024-10-29 DIAGNOSIS — N52.01 ERECTILE DYSFUNCTION DUE TO ARTERIAL INSUFFICIENCY: ICD-10-CM

## 2024-10-29 LAB
BILIRUB BLD-MCNC: NEGATIVE MG/DL
CLARITY, POC: CLEAR
COLOR UR: ABNORMAL
EXPIRATION DATE: ABNORMAL
GLUCOSE UR STRIP-MCNC: NEGATIVE MG/DL
KETONES UR QL: NEGATIVE
LEUKOCYTE EST, POC: NEGATIVE
Lab: ABNORMAL
NITRITE UR-MCNC: NEGATIVE MG/ML
PH UR: 6 [PH] (ref 5–8)
PROT UR STRIP-MCNC: NEGATIVE MG/DL
RBC # UR STRIP: NEGATIVE /UL
SP GR UR: 1.03 (ref 1–1.03)
UROBILINOGEN UR QL: ABNORMAL

## 2024-11-18 ENCOUNTER — OFFICE VISIT (OUTPATIENT)
Dept: INTERNAL MEDICINE | Facility: CLINIC | Age: 74
End: 2024-11-18
Payer: MEDICARE

## 2024-11-18 VITALS
RESPIRATION RATE: 18 BRPM | BODY MASS INDEX: 24.84 KG/M2 | HEART RATE: 55 BPM | OXYGEN SATURATION: 97 % | DIASTOLIC BLOOD PRESSURE: 82 MMHG | SYSTOLIC BLOOD PRESSURE: 144 MMHG | HEIGHT: 76 IN | TEMPERATURE: 97.6 F | WEIGHT: 204 LBS

## 2024-11-18 DIAGNOSIS — R79.9 ABNORMAL FINDING OF BLOOD CHEMISTRY, UNSPECIFIED: ICD-10-CM

## 2024-11-18 DIAGNOSIS — E55.9 VITAMIN D DEFICIENCY: ICD-10-CM

## 2024-11-18 DIAGNOSIS — R41.3 MEMORY DEFICIT: ICD-10-CM

## 2024-11-18 DIAGNOSIS — N40.1 BENIGN PROSTATIC HYPERPLASIA WITH URINARY FREQUENCY: ICD-10-CM

## 2024-11-18 DIAGNOSIS — D50.9 IRON DEFICIENCY ANEMIA, UNSPECIFIED IRON DEFICIENCY ANEMIA TYPE: ICD-10-CM

## 2024-11-18 DIAGNOSIS — F03.B0 MODERATE DEMENTIA WITHOUT BEHAVIORAL DISTURBANCE, PSYCHOTIC DISTURBANCE, MOOD DISTURBANCE, OR ANXIETY, UNSPECIFIED DEMENTIA TYPE: Primary | ICD-10-CM

## 2024-11-18 DIAGNOSIS — R35.0 BENIGN PROSTATIC HYPERPLASIA WITH URINARY FREQUENCY: ICD-10-CM

## 2024-11-18 DIAGNOSIS — Z00.00 ENCOUNTER FOR ANNUAL WELLNESS VISIT (AWV) IN MEDICARE PATIENT: ICD-10-CM

## 2024-11-18 DIAGNOSIS — E53.8 B12 DEFICIENCY: ICD-10-CM

## 2024-11-18 LAB
25(OH)D3 SERPL-MCNC: 49.4 NG/ML (ref 30–100)
ALBUMIN SERPL-MCNC: 3.8 G/DL (ref 3.5–5.2)
ALBUMIN/GLOB SERPL: 1.4 G/DL
ALP SERPL-CCNC: 86 U/L (ref 39–117)
ALT SERPL W P-5'-P-CCNC: 7 U/L (ref 1–41)
ANION GAP SERPL CALCULATED.3IONS-SCNC: 6 MMOL/L (ref 5–15)
AST SERPL-CCNC: 23 U/L (ref 1–40)
BASOPHILS # BLD AUTO: 0.05 10*3/MM3 (ref 0–0.2)
BASOPHILS NFR BLD AUTO: 1.1 % (ref 0–1.5)
BILIRUB SERPL-MCNC: 0.5 MG/DL (ref 0–1.2)
BUN SERPL-MCNC: 19 MG/DL (ref 8–23)
BUN/CREAT SERPL: 15.6 (ref 7–25)
CALCIUM SPEC-SCNC: 8.9 MG/DL (ref 8.6–10.5)
CHLORIDE SERPL-SCNC: 105 MMOL/L (ref 98–107)
CHOLEST SERPL-MCNC: 170 MG/DL (ref 0–200)
CO2 SERPL-SCNC: 28 MMOL/L (ref 22–29)
CREAT SERPL-MCNC: 1.22 MG/DL (ref 0.76–1.27)
DEPRECATED RDW RBC AUTO: 41.4 FL (ref 37–54)
EGFRCR SERPLBLD CKD-EPI 2021: 62.2 ML/MIN/1.73
EOSINOPHIL # BLD AUTO: 0.41 10*3/MM3 (ref 0–0.4)
EOSINOPHIL NFR BLD AUTO: 9 % (ref 0.3–6.2)
ERYTHROCYTE [DISTWIDTH] IN BLOOD BY AUTOMATED COUNT: 12.1 % (ref 12.3–15.4)
FOLATE SERPL-MCNC: 19.6 NG/ML (ref 4.78–24.2)
GLOBULIN UR ELPH-MCNC: 2.7 GM/DL
GLUCOSE SERPL-MCNC: 79 MG/DL (ref 65–99)
HCT VFR BLD AUTO: 38.6 % (ref 37.5–51)
HDLC SERPL-MCNC: 42 MG/DL (ref 40–60)
HGB BLD-MCNC: 13.5 G/DL (ref 13–17.7)
IMM GRANULOCYTES # BLD AUTO: 0.02 10*3/MM3 (ref 0–0.05)
IMM GRANULOCYTES NFR BLD AUTO: 0.4 % (ref 0–0.5)
IRON 24H UR-MRATE: 107 MCG/DL (ref 59–158)
IRON SATN MFR SERPL: 34 % (ref 20–50)
LDLC SERPL CALC-MCNC: 110 MG/DL (ref 0–100)
LDLC/HDLC SERPL: 2.59 {RATIO}
LYMPHOCYTES # BLD AUTO: 1.22 10*3/MM3 (ref 0.7–3.1)
LYMPHOCYTES NFR BLD AUTO: 26.7 % (ref 19.6–45.3)
MCH RBC QN AUTO: 32.7 PG (ref 26.6–33)
MCHC RBC AUTO-ENTMCNC: 35 G/DL (ref 31.5–35.7)
MCV RBC AUTO: 93.5 FL (ref 79–97)
MONOCYTES # BLD AUTO: 0.49 10*3/MM3 (ref 0.1–0.9)
MONOCYTES NFR BLD AUTO: 10.7 % (ref 5–12)
NEUTROPHILS NFR BLD AUTO: 2.38 10*3/MM3 (ref 1.7–7)
NEUTROPHILS NFR BLD AUTO: 52.1 % (ref 42.7–76)
NRBC BLD AUTO-RTO: 0 /100 WBC (ref 0–0.2)
PLATELET # BLD AUTO: 222 10*3/MM3 (ref 140–450)
PMV BLD AUTO: 10.1 FL (ref 6–12)
POTASSIUM SERPL-SCNC: 4.4 MMOL/L (ref 3.5–5.2)
PROT SERPL-MCNC: 6.5 G/DL (ref 6–8.5)
RBC # BLD AUTO: 4.13 10*6/MM3 (ref 4.14–5.8)
SODIUM SERPL-SCNC: 139 MMOL/L (ref 136–145)
TIBC SERPL-MCNC: 317 MCG/DL (ref 298–536)
TRANSFERRIN SERPL-MCNC: 213 MG/DL (ref 200–360)
TRIGL SERPL-MCNC: 96 MG/DL (ref 0–150)
TSH SERPL DL<=0.05 MIU/L-ACNC: 2.07 UIU/ML (ref 0.27–4.2)
VIT B12 BLD-MCNC: 888 PG/ML (ref 211–946)
VLDLC SERPL-MCNC: 18 MG/DL (ref 5–40)
WBC NRBC COR # BLD AUTO: 4.57 10*3/MM3 (ref 3.4–10.8)

## 2024-11-18 PROCEDURE — 84443 ASSAY THYROID STIM HORMONE: CPT | Performed by: INTERNAL MEDICINE

## 2024-11-18 PROCEDURE — 1160F RVW MEDS BY RX/DR IN RCRD: CPT | Performed by: INTERNAL MEDICINE

## 2024-11-18 PROCEDURE — 1159F MED LIST DOCD IN RCRD: CPT | Performed by: INTERNAL MEDICINE

## 2024-11-18 PROCEDURE — 83540 ASSAY OF IRON: CPT | Performed by: INTERNAL MEDICINE

## 2024-11-18 PROCEDURE — 84466 ASSAY OF TRANSFERRIN: CPT | Performed by: INTERNAL MEDICINE

## 2024-11-18 PROCEDURE — 82746 ASSAY OF FOLIC ACID SERUM: CPT | Performed by: INTERNAL MEDICINE

## 2024-11-18 PROCEDURE — 80061 LIPID PANEL: CPT | Performed by: INTERNAL MEDICINE

## 2024-11-18 PROCEDURE — 36415 COLL VENOUS BLD VENIPUNCTURE: CPT | Performed by: INTERNAL MEDICINE

## 2024-11-18 PROCEDURE — G0439 PPPS, SUBSEQ VISIT: HCPCS | Performed by: INTERNAL MEDICINE

## 2024-11-18 PROCEDURE — 82607 VITAMIN B-12: CPT | Performed by: INTERNAL MEDICINE

## 2024-11-18 PROCEDURE — 99213 OFFICE O/P EST LOW 20 MIN: CPT | Performed by: INTERNAL MEDICINE

## 2024-11-18 PROCEDURE — 80053 COMPREHEN METABOLIC PANEL: CPT | Performed by: INTERNAL MEDICINE

## 2024-11-18 PROCEDURE — 85025 COMPLETE CBC W/AUTO DIFF WBC: CPT | Performed by: INTERNAL MEDICINE

## 2024-11-18 PROCEDURE — 82306 VITAMIN D 25 HYDROXY: CPT | Performed by: INTERNAL MEDICINE

## 2024-11-18 PROCEDURE — 1126F AMNT PAIN NOTED NONE PRSNT: CPT | Performed by: INTERNAL MEDICINE

## 2024-11-18 RX ORDER — NYSTATIN AND TRIAMCINOLONE ACETONIDE 100000; 1 [USP'U]/G; MG/G
1 OINTMENT TOPICAL 2 TIMES DAILY
Qty: 180 G | Refills: 4 | Status: SHIPPED | OUTPATIENT
Start: 2024-11-18

## 2024-11-18 RX ORDER — ETODOLAC 400 MG/1
400 TABLET, EXTENDED RELEASE ORAL 2 TIMES DAILY WITH MEALS
Qty: 180 TABLET | Refills: 1 | Status: SHIPPED | OUTPATIENT
Start: 2024-11-18

## 2024-11-18 RX ORDER — BETAMETHASONE DIPROPIONATE 0.5 MG/G
LOTION TOPICAL DAILY
Qty: 180 ML | Refills: 1 | Status: SHIPPED | OUTPATIENT
Start: 2024-11-18

## 2024-11-18 RX ORDER — QUETIAPINE FUMARATE 100 MG/1
TABLET, FILM COATED ORAL
Qty: 90 TABLET | Refills: 2 | Status: SHIPPED | OUTPATIENT
Start: 2024-11-18

## 2024-11-18 RX ORDER — VITAMIN B COMPLEX 100; 2; 100; 2; 2 MG/ML; MG/ML; MG/ML; MG/ML; MG/ML
1 INJECTION INTRAMUSCULAR; INTRAVENOUS DAILY
Qty: 90 ML | Refills: 1 | Status: SHIPPED | OUTPATIENT
Start: 2024-11-18

## 2024-11-18 RX ORDER — ASPIRIN 81 MG/1
81 TABLET, CHEWABLE ORAL DAILY
Qty: 90 TABLET | Refills: 1 | Status: SHIPPED | OUTPATIENT
Start: 2024-11-18

## 2024-11-18 RX ORDER — CLONAZEPAM 1 MG/1
1 TABLET ORAL NIGHTLY
Qty: 60 TABLET | Refills: 2 | Status: SHIPPED | OUTPATIENT
Start: 2024-11-18

## 2024-11-18 RX ORDER — DONEPEZIL HYDROCHLORIDE 10 MG/1
10 TABLET, FILM COATED ORAL DAILY
Qty: 90 TABLET | Refills: 1 | Status: SHIPPED | OUTPATIENT
Start: 2024-11-18

## 2024-11-18 NOTE — ASSESSMENT & PLAN NOTE
Orders:    clonazePAM (KlonoPIN) 1 MG tablet; Take 1 tablet by mouth Every Night.    Ambulatory Referral to Neurology    Comprehensive Metabolic Panel    CBC & Differential    TSH    Lipid Panel    Iron Profile

## 2024-11-18 NOTE — ASSESSMENT & PLAN NOTE
Orders:    clonazePAM (KlonoPIN) 1 MG tablet; Take 1 tablet by mouth Every Night.    donepezil (ARICEPT) 10 MG tablet; Take 1 tablet by mouth Daily.    Ambulatory Referral to Neurology    TSH

## 2024-11-18 NOTE — PROGRESS NOTES
"Chief Complaint  Medicare Wellness-subsequent, Anemia (Follow up ), and Memory Loss (Follow up )      Subjective      History of Present Illness  The patient presents for evaluation of multiple medical concerns. He is accompanied by his wife.    He reports feeling well overall. He has been attempting to reduce his Seroquel dosage, which has resulted in less sleep, but he remains functional. His wife notes an increase in his alertness. The reduction in Seroquel has not exacerbated his tinnitus. His wife requests a prescription for 100 mg of Seroquel, to be taken 2 or 3 times at bedtime, with a 3-month supply. She believes he has shown improvement since starting Seroquel.    He is not currently under the care of a neurologist for his memory issues. His physical and mental health remain unchanged compared to last year. He has not required overnight hospitalization. His wife is his designated decision maker.    He denies experiencing chest pain or breathing difficulties. His urination is normal. He experiences numbness in his fingers, but no tingling.    He has consulted Dr. Osullivan for ejaculation issues.     IMMUNIZATIONS  He received influenza vaccine on 10/01/2024.         Objective   Vital Signs:   Vitals:    11/18/24 0803   BP: 144/82   BP Location: Right arm   Patient Position: Sitting   Cuff Size: Adult   Pulse: 55   Resp: 18   Temp: 97.6 °F (36.4 °C)   TempSrc: Temporal   SpO2: 97%   Weight: 92.5 kg (204 lb)   Height: 193 cm (75.98\")   PainSc: 0-No pain     Body mass index is 24.84 kg/m².    Wt Readings from Last 3 Encounters:   11/18/24 92.5 kg (204 lb)   10/29/24 91.3 kg (201 lb 3.2 oz)   09/09/24 94.4 kg (208 lb 3.2 oz)     BP Readings from Last 3 Encounters:   11/18/24 144/82   09/09/24 137/65   05/20/24 124/66       Health Maintenance   Topic Date Due    COVID-19 Vaccine (6 - 2024-25 season) 11/29/2024 (Originally 9/1/2024)    ANNUAL WELLNESS VISIT  11/18/2025    COLORECTAL CANCER SCREENING  11/28/2028    " TDAP/TD VACCINES (4 - Td or Tdap) 04/15/2034    HEPATITIS C SCREENING  Completed    INFLUENZA VACCINE  Completed    Pneumococcal Vaccine 65+  Completed    ZOSTER VACCINE  Completed       Physical Exam  Vitals reviewed.   Constitutional:       Appearance: Normal appearance. He is well-developed.   HENT:      Head: Normocephalic and atraumatic.      Right Ear: External ear normal.      Left Ear: External ear normal.   Eyes:      Conjunctiva/sclera: Conjunctivae normal.      Pupils: Pupils are equal, round, and reactive to light.   Cardiovascular:      Rate and Rhythm: Normal rate and regular rhythm.      Heart sounds: No murmur heard.     No friction rub. No gallop.   Pulmonary:      Effort: Pulmonary effort is normal.      Breath sounds: Normal breath sounds. No wheezing or rhonchi.   Skin:     General: Skin is warm and dry.   Neurological:      Mental Status: He is alert.      Comments: Somewhat flat affect and slight masked facies   Psychiatric:         Mood and Affect: Affect normal.        Physical Exam        Result Review :  The following data was reviewed by: Taylor Blake MD on 11/18/2024:         Results      BMI is within normal parameters. No other follow-up for BMI required.       Procedures            Assessment & Plan  Memory deficit    Orders:    clonazePAM (KlonoPIN) 1 MG tablet; Take 1 tablet by mouth Every Night.    donepezil (ARICEPT) 10 MG tablet; Take 1 tablet by mouth Daily.    Ambulatory Referral to Neurology    TSH    Moderate dementia without behavioral disturbance, psychotic disturbance, mood disturbance, or anxiety, unspecified dementia type      Orders:    clonazePAM (KlonoPIN) 1 MG tablet; Take 1 tablet by mouth Every Night.    Ambulatory Referral to Neurology    Comprehensive Metabolic Panel    CBC & Differential    TSH    Lipid Panel    Iron Profile    Benign prostatic hyperplasia with urinary frequency    Orders:    Comprehensive Metabolic Panel    CBC & Differential    TSH     Lipid Panel    Iron Profile    Vitamin D deficiency    Orders:    Vitamin D,25-Hydroxy    B12 deficiency    Orders:    Vitamin B12 & Folate    Iron deficiency anemia, unspecified iron deficiency anemia type    Orders:    Iron Profile    Abnormal finding of blood chemistry, unspecified    Orders:    Lipid Panel    Encounter for annual wellness visit (AWV) in Medicare patient              Assessment & Plan  1. Memory issues.  He has been experiencing memory concerns, and a referral to a neurologist specializing in dementia care at [facility] was made. The potential benefits of newer treatments for Alzheimer's dementia were discussed. He was advised to follow up with the neurologist to explore these options.    2. Medication management.  His Seroquel dosage was reduced from 400 mg to 100 mg, to be taken 2 to 3 tablets at bedtime. The prescription for Seroquel 100 mg will be sent to the pharmacy. He was advised to monitor his response to the lower dose and adjust as needed. The following medications were refilled: aspirin, B12 complex vitamin, betamethasone cream, Klonopin, donepezil, Aricept, etodolac, nystatin, triamcinolone cream, and vitamin D.    3. Elevated blood pressure.  His blood pressure was slightly elevated today. He was advised to monitor his blood pressure at home. If readings consistently exceed 130, treatment may be considered.    4. Sleep issues.  He reported not sleeping as well with the lower dose of Seroquel but feels less groggy in the morning. The use of clonazepam to aid sleep was discussed. The option of trying different sleep medications in the future was mentioned if needed.    5. Health Maintenance.  Basic blood work was ordered. He received a flu shot on October 1st.        Patient or patient representative verbalized consent for the use of Ambient Listening during the visit with  Taylor Blake MD for chart documentation. 11/18/2024  17:45 EST      FOLLOW UP  Return  in about 6 months (around 5/18/2025).  Patient was given instructions and counseling regarding his condition or for health maintenance advice. Please see specific information pulled into the AVS if appropriate.     Taylor Blake MD  11/18/24  17:44 EST    CURRENT & DISCONTINUED MEDICATIONS  Current Outpatient Medications   Medication Instructions    aspirin 81 mg, Oral, Daily    B Complex Vitamins (Vitamin B Complex 100) solution 1 tablet, Oral, Daily    betamethasone dipropionate 0.05 % lotion Topical, Daily    clonazePAM (KLONOPIN) 1 mg, Oral, Nightly    donepezil (ARICEPT) 10 mg, Oral, Daily    etodolac XL (LODINE XL) 400 mg, Oral, 2 Times Daily With Meals    multivitamin with minerals tablet tablet 1 tablet, Daily    nystatin-triamcinolone (MYCOLOG) 109140-0.1 UNIT/GM-% ointment 1 Application, Topical, 2 Times Daily    polysacchar iron-FA-B12 (Ferrex 150 Forte) 150-1-25 MG-MG-MCG capsule capsule 1 capsule, Oral, 2 Times Daily    QUEtiapine (SEROquel) 100 MG tablet 2-3 tab po qhs prn insomnia    sildenafil (REVATIO) 20 mg, Oral, Daily PRN, Take 2-4 tablets 1 hour prior to sexual activity as needed    vitamin D3 5,000 Units, Oral, Daily       Medications Discontinued During This Encounter   Medication Reason    QUEtiapine (SEROquel) 400 MG tablet     B Complex Vitamins (VITAMIN B COMPLEX 100 IJ) Reorder    clonazePAM (KlonoPIN) 1 MG tablet Reorder    aspirin 81 MG chewable tablet Reorder    vitamin D3 125 MCG (5000 UT) capsule capsule Reorder    betamethasone dipropionate 0.05 % lotion Reorder    nystatin-triamcinolone (MYCOLOG) 519226-4.1 UNIT/GM-% ointment Reorder    etodolac XL (LODINE XL) 400 MG 24 hr tablet Reorder    donepezil (ARICEPT) 10 MG tablet Reorder    QUEtiapine (SEROquel) 100 MG tablet

## 2024-11-18 NOTE — PROGRESS NOTES
Subjective   The ABCs of the Annual Wellness Visit  Medicare Wellness Visit      Chinmay Roque is a 74 y.o. patient who presents for a Medicare Wellness Visit.    The following portions of the patient's history were reviewed and   updated as appropriate: allergies, current medications, past family history, past medical history, past social history, past surgical history, and problem list.    Compared to one year ago, the patient's physical   health is the same.  Compared to one year ago, the patient's mental   health is the same.    Recent Hospitalizations:  He was not admitted to the hospital during the last year.     Current Medical Providers:  Patient Care Team:  Taylor Blake MD as PCP - General (Internal Medicine)  Jesse Monteiro MD as Consulting Physician (Pulmonary Disease)  Hugo Osullivan MD as Consulting Physician (Urology)  Adrian Perkins Jeffrey, MD as Consulting Physician (Dermatology)    Outpatient Medications Prior to Visit   Medication Sig Dispense Refill    multivitamin with minerals tablet tablet Take 1 tablet by mouth Daily.      polysacchar iron-FA-B12 (Ferrex 150 Forte) 150-1-25 MG-MG-MCG capsule capsule Take 1 capsule by mouth 2 (Two) Times a Day. 180 capsule 1    sildenafil (REVATIO) 20 MG tablet Take 1 tablet by mouth Daily As Needed (Sexual Activity). Take 2-4 tablets 1 hour prior to sexual activity as needed 90 tablet 1    aspirin 81 MG chewable tablet Chew 1 tablet Daily. 90 tablet 1    B Complex Vitamins (VITAMIN B COMPLEX 100 IJ) vitamin B complex oral capsule take 1 capsule by oral route daily   Active      betamethasone dipropionate 0.05 % lotion Apply  topically to the appropriate area as directed Daily. 180 mL 1    clonazePAM (KlonoPIN) 1 MG tablet 1 tablet Every Night.      donepezil (ARICEPT) 10 MG tablet Take 1 tablet by mouth Daily. 90 tablet 1    etodolac XL (LODINE XL) 400 MG 24 hr tablet Take 1 tablet by mouth 2 (Two) Times a Day With Meals. 180  "tablet 1    nystatin-triamcinolone (MYCOLOG) 368447-7.1 UNIT/GM-% ointment Apply 1 Application topically to the appropriate area as directed 2 (Two) Times a Day. 180 g 4    QUEtiapine (SEROquel) 100 MG tablet take 4 to 8 tablets BY MOUTH EVERY night      QUEtiapine (SEROquel) 400 MG tablet Take 1-2 tablets by mouth Every Night. 180 tablet 2    vitamin D3 125 MCG (5000 UT) capsule capsule Take 1 capsule by mouth Daily.       No facility-administered medications prior to visit.     No opioid medication identified on active medication list. I have reviewed chart for other potential  high risk medication/s and harmful drug interactions in the elderly.      Aspirin is on active medication list. Aspirin use is indicated based on review of current medical condition/s. Pros and cons of this therapy have been discussed today. Benefits of this medication outweigh potential harm.  Patient has been encouraged to continue taking this medication.  .      Patient Active Problem List   Diagnosis    Benign prostatic hyperplasia with urinary frequency    Urinary retention    Tinnitus    Iron deficiency anemia    Encounter for screening for malignant neoplasm of colon    Personal history of colonic polyps    Memory deficit    Lung nodule seen on imaging study    Solitary lung nodule    Never smoked cigarettes    Family history of lung cancer    Moderate dementia    Benign prostatic hyperplasia with lower urinary tract symptoms    Erectile dysfunction due to arterial insufficiency     Advance Care Planning Advance Directive is on file.  ACP discussion was held with the patient during this visit. Patient has an advance directive in EMR which is still valid.             Objective   Vitals:    11/18/24 0803   BP: 144/82   BP Location: Right arm   Patient Position: Sitting   Cuff Size: Adult   Pulse: 55   Resp: 18   Temp: 97.6 °F (36.4 °C)   TempSrc: Temporal   SpO2: 97%   Weight: 92.5 kg (204 lb)   Height: 193 cm (75.98\")   PainSc: 0-No " "pain       Estimated body mass index is 24.84 kg/m² as calculated from the following:    Height as of this encounter: 193 cm (75.98\").    Weight as of this encounter: 92.5 kg (204 lb).    BMI is within normal parameters. No other follow-up for BMI required.       Does the patient have evidence of cognitive impairment? Yes                                                                                               Health  Risk Assessment    Smoking Status:  Social History     Tobacco Use   Smoking Status Never    Passive exposure: Never   Smokeless Tobacco Never     Alcohol Consumption:  Social History     Substance and Sexual Activity   Alcohol Use Yes    Alcohol/week: 2.0 standard drinks of alcohol    Types: 2 Glasses of wine per week    Comment: Brother had prostate cancer       Fall Risk Screen  STEADI Fall Risk Assessment was completed, and patient is at LOW risk for falls.Assessment completed on:2024    Depression Screening   Little interest or pleasure in doing things? Not at all   Feeling down, depressed, or hopeless? Not at all   PHQ-2 Total Score 0      Health Habits and Functional and Cognitive Screenin/11/2024     6:31 PM   Functional & Cognitive Status   Do you have difficulty preparing food and eating? No    Do you have difficulty bathing yourself, getting dressed or grooming yourself? No    Do you have difficulty using the toilet? No    Do you have difficulty moving around from place to place? No    Do you have trouble with steps or getting out of a bed or a chair? No    Current Diet Well Balanced Diet    Dental Exam Up to date    Eye Exam Up to date    Exercise (times per week) 1 times per week    Current Exercises Include No Regular Exercise    Do you need help using the phone?  No    Are you deaf or do you have serious difficulty hearing?  No    Do you need help to go to places out of walking distance? No    Do you need help shopping? No    Do you need help preparing meals?  No  "   Do you need help with housework?  No    Do you need help with laundry? No    Do you need help taking your medications? No    Do you need help managing money? No    Do you ever drive or ride in a car without wearing a seat belt? No    Have you felt unusual stress, anger or loneliness in the last month? No    Who do you live with? Spouse    If you need help, do you have trouble finding someone available to you? No    Have you been bothered in the last four weeks by sexual problems? Yes    Do you have difficulty concentrating, remembering or making decisions? No        Patient-reported           Age-appropriate Screening Schedule:  Refer to the list below for future screening recommendations based on patient's age, sex and/or medical conditions. Orders for these recommended tests are listed in the plan section. The patient has been provided with a written plan.    Health Maintenance List  Health Maintenance   Topic Date Due    COVID-19 Vaccine (6 - 2024-25 season) 11/29/2024 (Originally 9/1/2024)    ANNUAL WELLNESS VISIT  11/18/2025    COLORECTAL CANCER SCREENING  11/28/2028    TDAP/TD VACCINES (4 - Td or Tdap) 04/15/2034    HEPATITIS C SCREENING  Completed    INFLUENZA VACCINE  Completed    Pneumococcal Vaccine 65+  Completed    ZOSTER VACCINE  Completed                                                                                                                                                CMS Preventative Services Quick Reference  Risk Factors Identified During Encounter  None Identified    The above risks/problems have been discussed with the patient.  Pertinent information has been shared with the patient in the After Visit Summary.  An After Visit Summary and PPPS were made available to the patient.    Follow Up:   Next Medicare Wellness visit to be scheduled in 1 year.     Assessment & Plan  Memory deficit    Orders:    clonazePAM (KlonoPIN) 1 MG tablet; Take 1 tablet by mouth Every Night.    donepezil  (ARICEPT) 10 MG tablet; Take 1 tablet by mouth Daily.    Ambulatory Referral to Neurology    [facility]    Moderate dementia without behavioral disturbance, psychotic disturbance, mood disturbance, or anxiety, unspecified dementia type      Orders:    clonazePAM (KlonoPIN) 1 MG tablet; Take 1 tablet by mouth Every Night.    Ambulatory Referral to Neurology    Comprehensive Metabolic Panel    CBC & Differential    TSH    Lipid Panel    Iron Profile    Benign prostatic hyperplasia with urinary frequency    Orders:    Comprehensive Metabolic Panel    CBC & Differential    TSH    Lipid Panel    Iron Profile    Vitamin D deficiency    Orders:    Vitamin D,25-Hydroxy    B12 deficiency    Orders:    Vitamin B12 & Folate    Iron deficiency anemia, unspecified iron deficiency anemia type    Orders:    Iron Profile    Abnormal finding of blood chemistry, unspecified    Orders:    Lipid Panel    Encounter for annual wellness visit (AWV) in Medicare patient              Follow Up:   Return in about 6 months (around 5/18/2025).

## 2024-11-18 NOTE — ASSESSMENT & PLAN NOTE
Orders:    Comprehensive Metabolic Panel    CBC & Differential    TSH    Lipid Panel    Iron Profile

## 2024-11-18 NOTE — ASSESSMENT & PLAN NOTE
Orders:    Comprehensive Metabolic Panel    CBC & Differential    TSH    Lipid Panel    Iron Profile     Price (Do Not Change): 0.00 Instructions: This plan will send the code FBSE to the PM system.  DO NOT or CHANGE the price. Detail Level: Simple

## 2024-12-02 ENCOUNTER — TELEPHONE (OUTPATIENT)
Dept: INTERNAL MEDICINE | Facility: CLINIC | Age: 74
End: 2024-12-02
Payer: MEDICARE

## 2024-12-02 NOTE — TELEPHONE ENCOUNTER
Caller: PAUL GILLIS    Relationship: Emergency Contact    Best call back number: [phone]       What is the provider, practice or medical service name: [facility]      Any additional details: REPORTS THAT REFERRAL HAS NOT BEEN RECEIVED BY THEM -  SHE CALLED TODAY 12/02 -- WOULD LIKE FOR US TO RESEND IT TO THEM

## 2025-03-24 ENCOUNTER — PATIENT MESSAGE (OUTPATIENT)
Dept: INTERNAL MEDICINE | Facility: CLINIC | Age: 75
End: 2025-03-24
Payer: MEDICARE

## 2025-05-12 ENCOUNTER — OFFICE VISIT (OUTPATIENT)
Dept: INTERNAL MEDICINE | Facility: CLINIC | Age: 75
End: 2025-05-12
Payer: MEDICARE

## 2025-05-12 VITALS
WEIGHT: 204.4 LBS | DIASTOLIC BLOOD PRESSURE: 82 MMHG | BODY MASS INDEX: 24.89 KG/M2 | HEART RATE: 59 BPM | TEMPERATURE: 98 F | SYSTOLIC BLOOD PRESSURE: 144 MMHG | RESPIRATION RATE: 18 BRPM | OXYGEN SATURATION: 98 % | HEIGHT: 76 IN

## 2025-05-12 DIAGNOSIS — H93.13 TINNITUS OF BOTH EARS: ICD-10-CM

## 2025-05-12 DIAGNOSIS — R93.89 ABNORMAL MRI: Primary | ICD-10-CM

## 2025-05-12 DIAGNOSIS — I10 PRIMARY HYPERTENSION: ICD-10-CM

## 2025-05-12 DIAGNOSIS — R79.9 ABNORMAL FINDING OF BLOOD CHEMISTRY, UNSPECIFIED: ICD-10-CM

## 2025-05-12 DIAGNOSIS — R41.3 MEMORY DEFICIT: ICD-10-CM

## 2025-05-12 DIAGNOSIS — F03.B0 MODERATE DEMENTIA WITHOUT BEHAVIORAL DISTURBANCE, PSYCHOTIC DISTURBANCE, MOOD DISTURBANCE, OR ANXIETY, UNSPECIFIED DEMENTIA TYPE: ICD-10-CM

## 2025-05-12 LAB
ALBUMIN SERPL-MCNC: 4.1 G/DL (ref 3.5–5.2)
ALBUMIN/GLOB SERPL: 1.2 G/DL
ALP SERPL-CCNC: 104 U/L (ref 39–117)
ALT SERPL W P-5'-P-CCNC: 5 U/L (ref 1–41)
ANION GAP SERPL CALCULATED.3IONS-SCNC: 9.6 MMOL/L (ref 5–15)
AST SERPL-CCNC: 20 U/L (ref 1–40)
BASOPHILS # BLD AUTO: 0.04 10*3/MM3 (ref 0–0.2)
BASOPHILS NFR BLD AUTO: 0.8 % (ref 0–1.5)
BILIRUB SERPL-MCNC: 0.6 MG/DL (ref 0–1.2)
BUN SERPL-MCNC: 25 MG/DL (ref 8–23)
BUN/CREAT SERPL: 21.4 (ref 7–25)
CALCIUM SPEC-SCNC: 9 MG/DL (ref 8.6–10.5)
CHLORIDE SERPL-SCNC: 103 MMOL/L (ref 98–107)
CHOLEST SERPL-MCNC: 128 MG/DL (ref 0–200)
CO2 SERPL-SCNC: 27.4 MMOL/L (ref 22–29)
CREAT SERPL-MCNC: 1.17 MG/DL (ref 0.76–1.27)
DEPRECATED RDW RBC AUTO: 42.3 FL (ref 37–54)
EGFRCR SERPLBLD CKD-EPI 2021: 65.4 ML/MIN/1.73
EOSINOPHIL # BLD AUTO: 0.38 10*3/MM3 (ref 0–0.4)
EOSINOPHIL NFR BLD AUTO: 7.8 % (ref 0.3–6.2)
ERYTHROCYTE [DISTWIDTH] IN BLOOD BY AUTOMATED COUNT: 12.3 % (ref 12.3–15.4)
GLOBULIN UR ELPH-MCNC: 3.4 GM/DL
GLUCOSE SERPL-MCNC: 86 MG/DL (ref 65–99)
HCT VFR BLD AUTO: 39.4 % (ref 37.5–51)
HDLC SERPL-MCNC: 46 MG/DL (ref 40–60)
HGB BLD-MCNC: 13.2 G/DL (ref 13–17.7)
IMM GRANULOCYTES # BLD AUTO: 0.01 10*3/MM3 (ref 0–0.05)
IMM GRANULOCYTES NFR BLD AUTO: 0.2 % (ref 0–0.5)
LDLC SERPL CALC-MCNC: 68 MG/DL (ref 0–100)
LDLC/HDLC SERPL: 1.48 {RATIO}
LYMPHOCYTES # BLD AUTO: 1.52 10*3/MM3 (ref 0.7–3.1)
LYMPHOCYTES NFR BLD AUTO: 31.2 % (ref 19.6–45.3)
MCH RBC QN AUTO: 32 PG (ref 26.6–33)
MCHC RBC AUTO-ENTMCNC: 33.5 G/DL (ref 31.5–35.7)
MCV RBC AUTO: 95.4 FL (ref 79–97)
MONOCYTES # BLD AUTO: 0.54 10*3/MM3 (ref 0.1–0.9)
MONOCYTES NFR BLD AUTO: 11.1 % (ref 5–12)
NEUTROPHILS NFR BLD AUTO: 2.38 10*3/MM3 (ref 1.7–7)
NEUTROPHILS NFR BLD AUTO: 48.9 % (ref 42.7–76)
NRBC BLD AUTO-RTO: 0 /100 WBC (ref 0–0.2)
PLATELET # BLD AUTO: 186 10*3/MM3 (ref 140–450)
PMV BLD AUTO: 10.2 FL (ref 6–12)
POTASSIUM SERPL-SCNC: 4 MMOL/L (ref 3.5–5.2)
PROT SERPL-MCNC: 7.5 G/DL (ref 6–8.5)
RBC # BLD AUTO: 4.13 10*6/MM3 (ref 4.14–5.8)
SODIUM SERPL-SCNC: 140 MMOL/L (ref 136–145)
TRIGL SERPL-MCNC: 70 MG/DL (ref 0–150)
TSH SERPL DL<=0.05 MIU/L-ACNC: 1.87 UIU/ML (ref 0.27–4.2)
VLDLC SERPL-MCNC: 14 MG/DL (ref 5–40)
WBC NRBC COR # BLD AUTO: 4.87 10*3/MM3 (ref 3.4–10.8)

## 2025-05-12 PROCEDURE — 80053 COMPREHEN METABOLIC PANEL: CPT | Performed by: INTERNAL MEDICINE

## 2025-05-12 PROCEDURE — 84443 ASSAY THYROID STIM HORMONE: CPT | Performed by: INTERNAL MEDICINE

## 2025-05-12 PROCEDURE — 80061 LIPID PANEL: CPT | Performed by: INTERNAL MEDICINE

## 2025-05-12 PROCEDURE — 85025 COMPLETE CBC W/AUTO DIFF WBC: CPT | Performed by: INTERNAL MEDICINE

## 2025-05-12 RX ORDER — ATORVASTATIN CALCIUM 10 MG/1
10 TABLET, FILM COATED ORAL DAILY
COMMUNITY
End: 2025-05-12 | Stop reason: SDUPTHER

## 2025-05-12 RX ORDER — ATORVASTATIN CALCIUM 10 MG/1
10 TABLET, FILM COATED ORAL DAILY
Qty: 90 TABLET | Refills: 1 | Status: SHIPPED | OUTPATIENT
Start: 2025-05-12

## 2025-05-12 RX ORDER — LEVOCETIRIZINE DIHYDROCHLORIDE 5 MG/1
5 TABLET, FILM COATED ORAL EVERY EVENING
Qty: 90 TABLET | Refills: 1 | Status: SHIPPED | OUTPATIENT
Start: 2025-05-12

## 2025-05-12 RX ORDER — ETODOLAC 400 MG/1
400 TABLET, EXTENDED RELEASE ORAL 2 TIMES DAILY WITH MEALS
Qty: 180 TABLET | Refills: 1 | Status: SHIPPED | OUTPATIENT
Start: 2025-05-12

## 2025-05-12 RX ORDER — BETAMETHASONE DIPROPIONATE 0.5 MG/G
LOTION TOPICAL DAILY
Qty: 180 ML | Refills: 1 | Status: SHIPPED | OUTPATIENT
Start: 2025-05-12

## 2025-05-12 RX ORDER — CLONAZEPAM 1 MG/1
1 TABLET ORAL NIGHTLY
Qty: 60 TABLET | Refills: 2 | Status: SHIPPED | OUTPATIENT
Start: 2025-05-12

## 2025-05-12 RX ORDER — DONEPEZIL HYDROCHLORIDE 10 MG/1
10 TABLET, FILM COATED ORAL DAILY
Qty: 90 TABLET | Refills: 1 | Status: SHIPPED | OUTPATIENT
Start: 2025-05-12

## 2025-05-12 RX ORDER — QUETIAPINE FUMARATE 100 MG/1
TABLET, FILM COATED ORAL
Qty: 90 TABLET | Refills: 2 | Status: SHIPPED | OUTPATIENT
Start: 2025-05-12

## 2025-05-12 RX ORDER — SILDENAFIL CITRATE 20 MG/1
20 TABLET ORAL DAILY PRN
Qty: 90 TABLET | Refills: 1 | Status: SHIPPED | OUTPATIENT
Start: 2025-05-12

## 2025-05-12 RX ORDER — NYSTATIN AND TRIAMCINOLONE ACETONIDE 100000; 1 [USP'U]/G; MG/G
1 OINTMENT TOPICAL 2 TIMES DAILY
Qty: 180 G | Refills: 4 | Status: SHIPPED | OUTPATIENT
Start: 2025-05-12

## 2025-05-12 NOTE — ASSESSMENT & PLAN NOTE
Orders:    clonazePAM (KlonoPIN) 1 MG tablet; Take 1 tablet by mouth Every Night.    Comprehensive Metabolic Panel    CBC & Differential    Lipid Panel    TSH Rfx On Abnormal To Free T4; Future

## 2025-05-12 NOTE — ASSESSMENT & PLAN NOTE
Orders:    clonazePAM (KlonoPIN) 1 MG tablet; Take 1 tablet by mouth Every Night.    donepezil (ARICEPT) 10 MG tablet; Take 1 tablet by mouth Daily.    Comprehensive Metabolic Panel    CBC & Differential    Lipid Panel    TSH Rfx On Abnormal To Free T4; Future

## 2025-05-12 NOTE — PROGRESS NOTES
"Chief Complaint  Anemia, Vitamin D Deficiency, Dementia (6 mo f/u /), and Results (Ct scan /)      Subjective      History of Present Illness  The patient presents for evaluation of sinus infection, hypertension, and stroke prevention.    Postnasal drip managed with Xyzal, resulting in significant improvement. No excessive drainage, facial pain, or pressure. No chest pain or respiratory distress. Tinnitus unchanged.    On Seroquel 100 mg at bedtime, occasionally increased. Previously on 400 mg. Advised by psychologist to maintain this dosage. Takes Klonopin at bedtime, finds it beneficial. Uses betamethasone lotion on scalp and etodolac as needed for pain. Current medications include Aricept and sildenafil.    Consulted neurologist Dr. Rai at [facility], diagnosed with mini stroke. Prescribed Lipitor 10 mg. Cholesterol levels historically normal. Scheduled to visit movement disorder clinic in 07/2025 to investigate potential parkinsonism.         Objective   Vital Signs:   Vitals:    05/12/25 0754   BP: 144/82   BP Location: Left arm   Patient Position: Sitting   Cuff Size: Adult   Pulse: 59   Resp: 18   Temp: 98 °F (36.7 °C)   TempSrc: Temporal   SpO2: 98%   Weight: 92.7 kg (204 lb 6.4 oz)   Height: 193 cm (75.98\")     Body mass index is 24.89 kg/m².    Wt Readings from Last 3 Encounters:   05/12/25 92.7 kg (204 lb 6.4 oz)   11/18/24 92.5 kg (204 lb)   10/29/24 91.3 kg (201 lb 3.2 oz)     BP Readings from Last 3 Encounters:   05/12/25 144/82   11/18/24 144/82   09/09/24 137/65       Health Maintenance   Topic Date Due    COVID-19 Vaccine (6 - 2024-25 season) 09/01/2024    INFLUENZA VACCINE  07/01/2025    ANNUAL WELLNESS VISIT  11/18/2025    COLORECTAL CANCER SCREENING  11/28/2028    TDAP/TD VACCINES (4 - Td or Tdap) 04/15/2034    HEPATITIS C SCREENING  Completed    Pneumococcal Vaccine 50+  Completed    ZOSTER VACCINE  Completed       Physical Exam  Vitals reviewed.   Constitutional:       Appearance: Normal " appearance. He is well-developed.   HENT:      Head: Normocephalic and atraumatic.      Right Ear: External ear normal.      Left Ear: External ear normal.   Eyes:      Conjunctiva/sclera: Conjunctivae normal.      Pupils: Pupils are equal, round, and reactive to light.   Cardiovascular:      Rate and Rhythm: Normal rate and regular rhythm.      Heart sounds: No murmur heard.     No friction rub. No gallop.   Pulmonary:      Effort: Pulmonary effort is normal.      Breath sounds: Normal breath sounds. No wheezing or rhonchi.   Skin:     General: Skin is warm and dry.   Neurological:      Mental Status: He is alert and oriented to person, place, and time.   Psychiatric:         Mood and Affect: Affect normal.         Behavior: Behavior normal.         Thought Content: Thought content normal.      Physical Exam        Result Review :  The following data was reviewed by: Taylor Blake MD on 05/12/2025:         Results  Reviewed recent MRI    BMI is within normal parameters. No other follow-up for BMI required.       Procedures            Assessment & Plan  Memory deficit    Orders:    clonazePAM (KlonoPIN) 1 MG tablet; Take 1 tablet by mouth Every Night.    donepezil (ARICEPT) 10 MG tablet; Take 1 tablet by mouth Daily.    Comprehensive Metabolic Panel    CBC & Differential    Lipid Panel    TSH Rfx On Abnormal To Free T4; Future    Moderate dementia without behavioral disturbance, psychotic disturbance, mood disturbance, or anxiety, unspecified dementia type      Orders:    clonazePAM (KlonoPIN) 1 MG tablet; Take 1 tablet by mouth Every Night.    Comprehensive Metabolic Panel    CBC & Differential    Lipid Panel    TSH Rfx On Abnormal To Free T4; Future    Abnormal MRI    Orders:    Comprehensive Metabolic Panel    CBC & Differential    Lipid Panel    TSH Rfx On Abnormal To Free T4; Future    Abnormal finding of blood chemistry, unspecified    Orders:    Lipid Panel    Primary hypertension           Tinnitus of  both ears              Assessment & Plan  Sinus Infection  - No excessive drainage, pain, or pressure reported  - Xyzal effective  - No treatment necessary    Hypertension  - /80 during visit  - Monitor BP at home daily for 2 weeks, report via MyChart  - If consistently >140, initiate antihypertensive medication  - If inconsistent, continue monitoring for 2 weeks, report average reading    Stroke Prevention  - Mini-stroke, advised by neurologist to continue aspirin and start Lipitor 10 mg  - Keep appointment with movement disorder clinic in July for parkinsonism check  - MRI showed minimal white matter changes    Tinnitus  - Currently taking Seroquel 100 mg at bedtime, Klonopin at bedtime, Aricept, etodolac as needed for pain, sildenafil, and betamethasone lotion for scalp  - Refills for Klonopin and Xyzal sent to pharmacy  - PDMP checked, as expected    Health Maintenance  - Blood glucose well-regulated, no A1c test needed  - Blood count and other parameters within normal limits  - CBC to assess blood count, CMP to evaluate sugar, liver function, electrolytes, and kidney function  - Screening thyroid test to be performed, previous results normal  - Cholesterol panel ordered    Patient or patient representative verbalized consent for the use of Ambient Listening during the visit with  Taylor Blake MD for chart documentation. 5/12/2025  09:25 EDT      FOLLOW UP  No follow-ups on file.  Patient was given instructions and counseling regarding his condition or for health maintenance advice. Please see specific information pulled into the AVS if appropriate.     Taylor Blake MD  05/12/25  09:35 EDT    CURRENT & DISCONTINUED MEDICATIONS  Current Outpatient Medications   Medication Instructions    aspirin 81 mg, Oral, Daily    atorvastatin (LIPITOR) 10 mg, Oral, Daily    B Complex Vitamins (Vitamin B Complex 100) solution 1 tablet, Oral, Daily    betamethasone dipropionate 0.05 % lotion Topical, Daily     clonazePAM (KLONOPIN) 1 mg, Oral, Nightly    donepezil (ARICEPT) 10 mg, Oral, Daily    etodolac XL (LODINE XL) 400 mg, Oral, 2 Times Daily With Meals    levocetirizine (XYZAL) 5 mg, Oral, Every Evening    multivitamin with minerals tablet tablet 1 tablet, Daily    nystatin-triamcinolone (MYCOLOG) 677231-7.1 UNIT/GM-% ointment 1 Application, Topical, 2 Times Daily    polysacchar iron-FA-B12 (Ferrex 150 Forte) 150-1-25 MG-MG-MCG capsule capsule 1 capsule, Oral, 2 Times Daily    QUEtiapine (SEROquel) 100 MG tablet 2-3 tab po qhs prn insomnia    sildenafil (REVATIO) 20 mg, Oral, Daily PRN, Take 2-4 tablets 1 hour prior to sexual activity as needed    vitamin D3 5,000 Units, Oral, Daily       Medications Discontinued During This Encounter   Medication Reason    sildenafil (REVATIO) 20 MG tablet Reorder    clonazePAM (KlonoPIN) 1 MG tablet Reorder    donepezil (ARICEPT) 10 MG tablet Reorder    etodolac XL (LODINE XL) 400 MG 24 hr tablet Reorder    nystatin-triamcinolone (MYCOLOG) 283243-4.1 UNIT/GM-% ointment Reorder    betamethasone dipropionate 0.05 % lotion Reorder    QUEtiapine (SEROquel) 100 MG tablet Reorder    atorvastatin (LIPITOR) 10 MG tablet Reorder

## 2025-08-26 ENCOUNTER — HOSPITAL ENCOUNTER (OUTPATIENT)
Dept: CT IMAGING | Facility: HOSPITAL | Age: 75
Discharge: HOME | End: 2025-08-26
Admitting: INTERNAL MEDICINE
Payer: MEDICARE

## 2025-08-26 DIAGNOSIS — Z78.9 NEVER SMOKED CIGARETTES: ICD-10-CM

## 2025-08-26 DIAGNOSIS — R91.1 SOLITARY LUNG NODULE: ICD-10-CM

## 2025-08-26 DIAGNOSIS — Z80.1 FAMILY HISTORY OF LUNG CANCER: ICD-10-CM

## 2025-08-26 DIAGNOSIS — Z77.120 MOLD EXPOSURE: ICD-10-CM

## 2025-08-26 PROCEDURE — 71250 CT THORAX DX C-: CPT

## (undated) DEVICE — Device: Brand: DEFENDO AIR/WATER/SUCTION AND BIOPSY VALVE

## (undated) DEVICE — GUIDE CATH ION REUS/15X

## (undated) DEVICE — KT ASP VIZISHOT 5SYRG 5BIOPSY/VLV21G EA/5

## (undated) DEVICE — BRUSH CYTO BRONCH 2X10MM DISP

## (undated) DEVICE — SOL IRRG H2O PL/BG 1000ML STRL

## (undated) DEVICE — VLV BRONCH

## (undated) DEVICE — STPCK 1WY SPINLOCK FML LL NONDEHP LF .20ML

## (undated) DEVICE — Device

## (undated) DEVICE — ADAPT VSN REBUS ION PROB/SXN 1/PU

## (undated) DEVICE — KT ACCSR REPROC REBUS ION IF1000 REUS

## (undated) DEVICE — SOLIDIFIER LIQLOC PLS 1500CC BT

## (undated) DEVICE — CVR CATH REPROC REBUS ION IF1000 REUS

## (undated) DEVICE — ADAPT VLV BIOP MAJ210 EA/20

## (undated) DEVICE — KT CONSUMABLE REPROC REBUS ION IF1000 DISP

## (undated) DEVICE — PROB VSN REBUS ION IF1000 PERIPH

## (undated) DEVICE — BG PROB ION VISION 1/PU

## (undated) DEVICE — LINER SURG CANSTR SXN S/RIGD 1500CC

## (undated) DEVICE — CONN SWVL REBUS ION 1/PU

## (undated) DEVICE — SNAR POLYP CAPTIFLEX XS/OVL 11X2.4MM 240CM 1P/U

## (undated) DEVICE — THE SINGLE USE ETRAP – POLYP TRAP IS USED FOR SUCTION RETRIEVAL OF ENDOSCOPICALLY REMOVED POLYPS.: Brand: ETRAP

## (undated) DEVICE — TRAP SPECI MUCUS LF 40CC

## (undated) DEVICE — BALN DIL BRONCH EBUS BFUC160FOL8 EA/20

## (undated) DEVICE — CONN JET HYDRA H20 AUXILIARY DISP